# Patient Record
Sex: MALE | Race: BLACK OR AFRICAN AMERICAN | NOT HISPANIC OR LATINO | Employment: FULL TIME | URBAN - METROPOLITAN AREA
[De-identification: names, ages, dates, MRNs, and addresses within clinical notes are randomized per-mention and may not be internally consistent; named-entity substitution may affect disease eponyms.]

---

## 2017-05-19 ENCOUNTER — HOSPITAL ENCOUNTER (EMERGENCY)
Facility: OTHER | Age: 57
Discharge: HOME | End: 2017-05-19
Attending: EMERGENCY MEDICINE
Payer: COMMERCIAL

## 2017-05-19 VITALS
SYSTOLIC BLOOD PRESSURE: 149 MMHG | RESPIRATION RATE: 20 BRPM | TEMPERATURE: 98 F | HEART RATE: 84 BPM | HEIGHT: 74 IN | BODY MASS INDEX: 40.43 KG/M2 | OXYGEN SATURATION: 96 % | DIASTOLIC BLOOD PRESSURE: 83 MMHG | WEIGHT: 315 LBS

## 2017-05-19 DIAGNOSIS — J40 BRONCHITIS: ICD-10-CM

## 2017-05-19 DIAGNOSIS — R11.10 POST-TUSSIVE EMESIS: ICD-10-CM

## 2017-05-19 DIAGNOSIS — J32.9 SINUSITIS, UNSPECIFIED CHRONICITY, UNSPECIFIED LOCATION: Primary | ICD-10-CM

## 2017-05-19 LAB
ALBUMIN SERPL-MCNC: 3.2 G/DL (ref 3.3–5.5)
ALBUMIN SERPL-MCNC: 3.4 G/DL (ref 3.3–5.5)
ALP SERPL-CCNC: 88 U/L (ref 42–141)
ALP SERPL-CCNC: 89 U/L (ref 42–141)
BILIRUB SERPL-MCNC: 0.5 MG/DL (ref 0.2–1.6)
BILIRUB SERPL-MCNC: 0.5 MG/DL (ref 0.2–1.6)
BUN SERPL-MCNC: 15 MG/DL (ref 7–22)
CALCIUM SERPL-MCNC: 8.4 MG/DL (ref 8–10.3)
CHLORIDE SERPL-SCNC: 100 MMOL/L (ref 98–108)
CREAT SERPL-MCNC: 1.2 MG/DL (ref 0.6–1.2)
GLUCOSE SERPL-MCNC: 112 MG/DL (ref 73–118)
POC ALT (SGPT): 30 U/L (ref 10–47)
POC ALT (SGPT): 33 U/L (ref 10–47)
POC AMYLASE: 61 U/L (ref 14–97)
POC AST (SGOT): 34 U/L (ref 11–38)
POC AST (SGOT): 35 U/L (ref 11–38)
POC B-TYPE NATRIURETIC PEPTIDE: <5 PG/ML (ref 0–100)
POC CARDIAC TROPONIN I: 0 NG/ML
POC GGT: 22 U/L (ref 5–65)
POC TCO2: 26 MMOL/L (ref 18–33)
POTASSIUM BLD-SCNC: 3.1 MMOL/L (ref 3.6–5.1)
PROTEIN, POC: 7 G/DL (ref 6.4–8.1)
PROTEIN, POC: 7.1 G/DL (ref 6.4–8.1)
SAMPLE: NORMAL
SODIUM BLD-SCNC: 140 MMOL/L (ref 128–145)

## 2017-05-19 PROCEDURE — 63600175 PHARM REV CODE 636 W HCPCS

## 2017-05-19 PROCEDURE — 99284 EMERGENCY DEPT VISIT MOD MDM: CPT | Mod: 25

## 2017-05-19 PROCEDURE — 80053 COMPREHEN METABOLIC PANEL: CPT

## 2017-05-19 PROCEDURE — 96372 THER/PROPH/DIAG INJ SC/IM: CPT

## 2017-05-19 PROCEDURE — 85025 COMPLETE CBC W/AUTO DIFF WBC: CPT

## 2017-05-19 PROCEDURE — 82040 ASSAY OF SERUM ALBUMIN: CPT

## 2017-05-19 PROCEDURE — 94640 AIRWAY INHALATION TREATMENT: CPT

## 2017-05-19 PROCEDURE — 63600175 PHARM REV CODE 636 W HCPCS: Performed by: EMERGENCY MEDICINE

## 2017-05-19 PROCEDURE — 84484 ASSAY OF TROPONIN QUANT: CPT

## 2017-05-19 PROCEDURE — 25000242 PHARM REV CODE 250 ALT 637 W/ HCPCS: Performed by: EMERGENCY MEDICINE

## 2017-05-19 PROCEDURE — 83880 ASSAY OF NATRIURETIC PEPTIDE: CPT

## 2017-05-19 RX ORDER — DOXAZOSIN 4 MG/1
4 TABLET ORAL EVERY 12 HOURS
COMMUNITY

## 2017-05-19 RX ORDER — CHLORTHALIDONE 25 MG/1
25 TABLET ORAL DAILY
COMMUNITY

## 2017-05-19 RX ORDER — CHLORPHENIRAMINE MALEATE 4 MG
4 TABLET ORAL EVERY 6 HOURS PRN
Qty: 30 TABLET | Refills: 0 | Status: SHIPPED | OUTPATIENT
Start: 2017-05-19

## 2017-05-19 RX ORDER — IPRATROPIUM BROMIDE AND ALBUTEROL SULFATE 2.5; .5 MG/3ML; MG/3ML
3 SOLUTION RESPIRATORY (INHALATION)
Status: COMPLETED | OUTPATIENT
Start: 2017-05-19 | End: 2017-05-19

## 2017-05-19 RX ORDER — AMLODIPINE BESYLATE 10 MG/1
10 TABLET ORAL DAILY
COMMUNITY

## 2017-05-19 RX ORDER — ALBUTEROL SULFATE 90 UG/1
2 AEROSOL, METERED RESPIRATORY (INHALATION) EVERY 6 HOURS PRN
Qty: 1 INHALER | Refills: 0 | Status: SHIPPED | OUTPATIENT
Start: 2017-05-19

## 2017-05-19 RX ORDER — HYDROCODONE POLISTIREX AND CHLORPHENIRAMINE POLISTIREX 10; 8 MG/5ML; MG/5ML
5 SUSPENSION, EXTENDED RELEASE ORAL NIGHTLY PRN
Qty: 60 ML | Refills: 0 | Status: SHIPPED | OUTPATIENT
Start: 2017-05-19

## 2017-05-19 RX ORDER — ONDANSETRON 2 MG/ML
INJECTION INTRAMUSCULAR; INTRAVENOUS
Status: COMPLETED
Start: 2017-05-19 | End: 2017-05-19

## 2017-05-19 RX ORDER — IRBESARTAN 300 MG/1
300 TABLET ORAL NIGHTLY
COMMUNITY

## 2017-05-19 RX ORDER — DEXAMETHASONE SODIUM PHOSPHATE 4 MG/ML
8 INJECTION, SOLUTION INTRA-ARTICULAR; INTRALESIONAL; INTRAMUSCULAR; INTRAVENOUS; SOFT TISSUE
Status: COMPLETED | OUTPATIENT
Start: 2017-05-19 | End: 2017-05-19

## 2017-05-19 RX ORDER — CARVEDILOL 25 MG/1
25 TABLET ORAL 2 TIMES DAILY WITH MEALS
COMMUNITY

## 2017-05-19 RX ORDER — BENZONATATE 100 MG/1
100 CAPSULE ORAL 3 TIMES DAILY PRN
Qty: 20 CAPSULE | Refills: 0 | Status: SHIPPED | OUTPATIENT
Start: 2017-05-19 | End: 2017-05-29

## 2017-05-19 RX ADMIN — IPRATROPIUM BROMIDE AND ALBUTEROL SULFATE 3 ML: .5; 3 SOLUTION RESPIRATORY (INHALATION) at 05:05

## 2017-05-19 RX ADMIN — ONDANSETRON 8 MG: 2 INJECTION INTRAMUSCULAR; INTRAVENOUS at 02:05

## 2017-05-19 RX ADMIN — DEXAMETHASONE SODIUM PHOSPHATE 8 MG: 4 INJECTION, SOLUTION INTRAMUSCULAR; INTRAVENOUS at 04:05

## 2017-05-19 NOTE — ED PROVIDER NOTES
Encounter Date: 5/19/2017       History     Chief Complaint   Patient presents with    Sinusitis     states he began with sinus problems on Sunday, tonight he began vomiting and burping, states his burps are very foul smelling    Vomiting     Review of patient's allergies indicates:   Allergen Reactions    Benicar [olmesartan] Swelling    Codeine Nausea And Vomiting     Patient is a 57 y.o. male presenting with the following complaint: URI.   URI   The primary symptoms include sore throat, cough and vomiting (post tussive emesis). Primary symptoms do not include fever, headaches, wheezing, abdominal pain, nausea, myalgias or rash. The current episode started several days ago (3-4 days). This is a new problem. The problem has been gradually worsening.   The sore throat is not accompanied by trouble swallowing or drooling.   Symptoms associated with the illness include sinus pressure, congestion and rhinorrhea.   currently taking amoxicillin 2000 mg for dental pain (started two days ago).  Taking Allegra since yesterday.    Past Medical History:   Diagnosis Date    Anticoagulant long-term use     Anxiety     DVT (deep venous thrombosis)     GERD (gastroesophageal reflux disease)     Hypertension      Past Surgical History:   Procedure Laterality Date    KNEE SURGERY      ROTATOR CUFF REPAIR       Family History   Problem Relation Age of Onset    Hyperlipidemia Mother     Stroke Mother     Heart disease Mother     Arthritis Mother     Hyperlipidemia Father     Stroke Father      Social History   Substance Use Topics    Smoking status: Never Smoker    Smokeless tobacco: None    Alcohol use No     Review of Systems   Constitutional: Negative for appetite change and fever.   HENT: Positive for congestion, dental problem, postnasal drip, rhinorrhea, sinus pressure, sore throat and voice change. Negative for drooling, facial swelling, sneezing and trouble swallowing.    Respiratory: Positive for cough.  Negative for shortness of breath and wheezing.    Cardiovascular: Negative for chest pain, palpitations and leg swelling.   Gastrointestinal: Positive for vomiting (post tussive emesis). Negative for abdominal pain, constipation, diarrhea and nausea.   Genitourinary: Negative for decreased urine volume and frequency.   Musculoskeletal: Negative for myalgias.   Skin: Negative for rash.   Neurological: Negative for headaches.   All other systems reviewed and are negative.      Physical Exam   Initial Vitals   BP Pulse Resp Temp SpO2   05/19/17 0212 05/19/17 0212 05/19/17 0212 05/19/17 0212 --   149/83 94 18 98.2 °F (36.8 °C)      Physical Exam    Nursing note and vitals reviewed.  Constitutional: He appears well-developed and well-nourished. He is not diaphoretic. He is Obese . No distress.   HENT:   Head: Normocephalic and atraumatic.   Right Ear: Tympanic membrane is not injected, not erythematous and not bulging. A middle ear effusion (pink-tinged mucus) is present.   Left Ear: Tympanic membrane is not injected, not erythematous and not bulging. A middle ear effusion (pink-tinged mucus) is present.   Nose: Mucosal edema present.   Mouth/Throat: Oropharynx is clear and moist. No oropharyngeal exudate.   Eyes: EOM are normal. Pupils are equal, round, and reactive to light. No scleral icterus.   Neck: Normal range of motion. Neck supple.   Cardiovascular: Normal rate, regular rhythm and intact distal pulses.   No murmur heard.  Pulmonary/Chest: No stridor. No respiratory distress. He has decreased breath sounds in the right upper field, the right middle field, the right lower field, the left upper field, the left middle field and the left lower field. He has no wheezes. He has no rhonchi. He has no rales. He exhibits no tenderness.   Abdominal: Soft. Bowel sounds are normal. There is no tenderness.   Musculoskeletal: Normal range of motion. He exhibits no edema.   Neurological: He is alert and oriented to person,  place, and time. He has normal strength.   Skin: Skin is warm. No pallor.   Psychiatric: He has a normal mood and affect.         ED Course   Procedures  Labs Reviewed   POCT B-TYPE NATRIURETIC PEPTIDE (BNP) - Abnormal; Notable for the following:        Result Value    POC B-Type Natriuretic Peptide <5.0 (*)     All other components within normal limits   POCT CMP - Abnormal; Notable for the following:     Albumin, POC 3.2 (*)     POC Potassium 3.1 (*)     All other components within normal limits   TROPONIN ISTAT   POCT CBC   POCT CMP   POCT LIVER PANEL   POCT B-TYPE NATRIURETIC PEPTIDE (BNP)   POCT TROPONIN   POCT LIVER PANEL     EKG Readings: (Independently Interpreted)   Initial Reading: No STEMI. Previous EKG: Compared with most recent EKG Previous EKG Date: 1/12/2014. Rhythm: Normal Sinus Rhythm. Heart Rate: 89. Ectopy: No Ectopy. Conduction: 1st Degree AV Block. ST Segments: Normal ST Segments. T Waves: Normal. Axis: Normal.          Medical Decision Making:   Clinical Tests:   Lab Tests: Ordered and Reviewed                   ED Course     Labs Reviewed  Admission on 05/19/2017   Component Date Value Ref Range Status    POC B-Type Natriuretic Peptide 05/19/2017 <5.0* 0.0 - 100.0 pg/mL Final    POC Cardiac Troponin I 05/19/2017 0.00  <0.09 ng/mL Final    Sample 05/19/2017 UNK   Final    Albumin, POC 05/19/2017 3.4  3.3 - 5.5 g/dL Final    Alkaline Phosphatase, POC 05/19/2017 88  42 - 141 U/L Final    ALT (SGPT), POC 05/19/2017 30  10 - 47 U/L Final    Amylase, POC 05/19/2017 61  14 - 97 U/L Final    AST (SGOT), POC 05/19/2017 35  11 - 38 U/L Final    POC GGT 05/19/2017 22  5 - 65 U/L Final    Bilirubin 05/19/2017 0.5  0.2 - 1.6 mg/dL Final    Protein 05/19/2017 7.1  6.4 - 8.1 g/dL Final    Albumin, POC 05/19/2017 3.2* 3.3 - 5.5 g/dL Final    Alkaline Phosphatase, POC 05/19/2017 89  42 - 141 U/L Final    ALT (SGPT), POC 05/19/2017 33  10 - 47 U/L Final    AST (SGOT), POC 05/19/2017 34  11 - 38  U/L Final    POC BUN 05/19/2017 15  7 - 22 mg/dL Final    Calcium, POC 05/19/2017 8.4  8.0 - 10.3 mg/dL Final    POC Chloride 05/19/2017 100  98 - 108 mmol/L Final    POC Creatinine 05/19/2017 1.2  0.6 - 1.2 mg/dL Final    POC Glucose 05/19/2017 112  73 - 118 mg/dL Final    POC Potassium 05/19/2017 3.1* 3.6 - 5.1 mmol/L Final    POC Sodium 05/19/2017 140  128 - 145 mmol/L Final    Bilirubin 05/19/2017 0.5  0.2 - 1.6 mg/dL Final    POC TCO2 05/19/2017 26  18 - 33 mmol/L Final    Protein 05/19/2017 7.0  6.4 - 8.1 g/dL Final        Imaging Reviewed    Imaging Results     None          Medications given in ED    Medications   ondansetron 4 mg/2 mL injection (8 mg  Given 5/19/17 025)   dexamethasone injection 8 mg (8 mg Intramuscular Given 5/19/17 8466)   albuterol-ipratropium 2.5mg-0.5mg/3mL nebulizer solution 3 mL (3 mLs Nebulization Given 5/19/17 0579)       Discharge Medications     Medication List with Changes/Refills   New Medications    ALBUTEROL 90 MCG/ACTUATION INHALER    Inhale 2 puffs into the lungs every 6 (six) hours as needed for Wheezing or Shortness of Breath.    BENZONATATE (TESSALON) 100 MG CAPSULE    Take 1 capsule (100 mg total) by mouth 3 (three) times daily as needed for Cough.    CHLORPHENIRAMINE (CHLOR-TRIMETON) 4 MG TABLET    Take 1 tablet (4 mg total) by mouth every 6 (six) hours as needed (runny nose and sinus congestion).    HYDROCODONE-CHLORPHENIRAMINE (TUSSIONEX) 10-8 MG/5 ML SUSPENSION    Take 5 mLs by mouth nightly as needed for Cough or Congestion.   Current Medications    ALPRAZOLAM (XANAX) 0.5 MG TABLET    Take 0.5 mg by mouth 3 (three) times daily.    AMLODIPINE (NORVASC) 10 MG TABLET    Take 10 mg by mouth once daily.    ASPIRIN (ECOTRIN) 81 MG EC TABLET    Take 81 mg by mouth once daily.    CARVEDILOL (COREG) 25 MG TABLET    Take 25 mg by mouth 2 (two) times daily with meals.    CHLORTHALIDONE (HYGROTEN) 25 MG TAB    Take 25 mg by mouth once daily.    DILTIAZEM (DILACOR  XR) 240 MG CDCR    Take 180 mg by mouth once daily.    DOXAZOSIN (CARDURA) 4 MG TABLET    Take 4 mg by mouth every 12 (twelve) hours.    IRBESARTAN (AVAPRO) 300 MG TABLET    Take 300 mg by mouth every evening.    NORTRIPTYLINE (PAMELOR) 50 MG CAPSULE    Take 50 mg by mouth every evening.    OLMESARTAN (BENICAR) 40 MG TABLET    Take 40 mg by mouth once daily.    PANTOPRAZOLE (PROTONIX) 40 MG TABLET    Take 40 mg by mouth once daily.    POTASSIUM CHLORIDE (KLOR-CON) 20 MEQ PACK    Take 20 mEq by mouth 4 (four) times daily.    SILDENAFIL (VIAGRA) 100 MG TABLET    Take 100 mg by mouth daily as needed.    TRIAMTERENE-HYDROCHLOROTHIAZIDE 37.5-25 MG (MAXZIDE-25) 37.5-25 MG PER TABLET    Take 1 tablet by mouth once daily.   Discontinued Medications    LEVALBUTEROL (XOPENEX HFA) 45 MCG/ACTUATION INHALER    Inhale 1-2 puffs into the lungs every 4 (four) hours as needed for Wheezing.    OXYCODONE-ACETAMINOPHEN 5-325 MG (PERCOCET) 5-325 MG PER TABLET    Take 1 tablet by mouth every 6 (six) hours as needed for Pain.             Patient discharged to home in stable condition with instructions to:   1. Please take all meds as prescribed.  2. Follow-up with your primary care doctor   3. Return precautions discussed and patient and/or family/caretaker understands to return to the emergency room for any concerns including worsening of your current symptoms, fever, chills, night sweats, worsening pain, chest pain, shortness of breath, nausea, vomiting, diarrhea, bleeding, headache, difficulty talking, visual disturbances, weakness, numbness or any other acute concerns    Clinical Impression:   The primary encounter diagnosis was Sinusitis, unspecified chronicity, unspecified location. Diagnoses of Bronchitis and Post-tussive emesis were also pertinent to this visit.          Jose Eduardo Sawyer MD  05/19/17 6072

## 2017-05-19 NOTE — ED AVS SNAPSHOT
[facility] EMERGENCY DEPARTMENT  [address] [place] [address]               Mundo MCKENZIE Adama Archibald   2017  2:48 AM   ED    Description:  Male : 1960   Department:  [facility] Emergency Department           Your Care was Coordinated By:     Provider Role From To    Jose Eduardo Sawyer MD Attending Provider 17 0241 --      Reason for Visit     Sinusitis     Vomiting           Diagnoses this Visit        Comments    Sinusitis, unspecified chronicity, unspecified location    -  Primary     Bronchitis         Post-tussive emesis           ED Disposition     ED Disposition Condition Comment    Discharge  Patient discharged to home in stable condition.              To Do List           Follow-up Information     Follow up with Contreras Ny MD. Call today.    Specialty:  Family Medicine    Why:  to schedule an appointment, for re-evaluation of today's complaint, and ongoing care    Contact information:    [address] [place] [id number] [phone]         These Medications        Disp Refills Start End    hydrocodone-chlorpheniramine (TUSSIONEX) 10-8 mg/5 mL suspension 60 mL 0 2017     Take 5 mLs by mouth nightly as needed for Cough or Congestion. - Oral    Pharmacy: [facility]/pharm[facility][id number] [facility] [place] [facility] [address] [facility] [facility] Ph #: [phone]       chlorpheniramine (CHLOR-TRIMETON) 4 mg tablet 30 tablet 0 2017     Take 1 tablet (4 mg total) by mouth every 6 (six) hours as needed (runny nose and sinus congestion). - Oral    Pharmacy: [facility]/pharmacy #[id number] [facility] [place] [facility] [address] Ph #: [phone]       albuterol 90 mcg/actuation inhaler 1 Inhaler 0 2017     Inhale 2 puffs into the lungs every 6 (six) hours as needed for Wheezing or Shortness of Breath. - Inhalation    Pharmacy: [facility]/pharmacy #[id number] - [place] - [address] [facility]. Ph #: [phone]       benzonatate (TESSALON) 100 MG capsule 20 capsule 0 2017    Take 1 capsule (100 mg  total) by mouth 3 (three) times daily as needed for Cough. - Oral    Pharmacy: [id number] - [place] - [address] [facility].  #: [phone]         [facility] On Call     [facility] On Call Nurse Care Line - 24/7 Assistance  Unless otherwise directed by your provider, please contact [facility] On-Call, our nurse care line that is available for 24/7 assistance.     Registered nurses in the [facility] provide: appointment scheduling, clinical advisement, health education, and other advisory services.  Call: [phone] (toll free)               Medications           Message regarding Medications     Verify the changes and/or additions to your medication regime listed below are the same as discussed with your clinician today.  If any of these changes or additions are incorrect, please notify your healthcare provider.        START taking these NEW medications        Refills    hydrocodone-chlorpheniramine (TUSSIONEX) 10-8 mg/5 mL suspension 0    Sig: Take 5 mLs by mouth nightly as needed for Cough or Congestion.    Class: Print    Route: Oral    chlorpheniramine (CHLOR-TRIMETON) 4 mg tablet 0    Sig: Take 1 tablet (4 mg total) by mouth every 6 (six) hours as needed (runny nose and sinus congestion).    Class: Normal    Route: Oral    albuterol 90 mcg/actuation inhaler 0    Sig: Inhale 2 puffs into the lungs every 6 (six) hours as needed for Wheezing or Shortness of Breath.    Class: Normal    Route: Inhalation    benzonatate (TESSALON) 100 MG capsule 0    Sig: Take 1 capsule (100 mg total) by mouth 3 (three) times daily as needed for Cough.    Class: Normal    Route: Oral      These medications were administered today        Dose Freq    ondansetron 4 mg/2 mL injection      Notes to Pharmacy: Created by cabinet override    dexamethasone injection 8 mg 8 mg ED 1 Time    Sig: Inject 2 mLs (8 mg total) into the muscle ED 1 Time.    Class: Normal    Route: Intramuscular    albuterol-ipratropium  2.5mg-0.5mg/3mL nebulizer solution 3 mL 3 mL ED 1 Time    Sig: Take 3 mLs by nebulization ED 1 Time.    Class: Normal    Route: Nebulization      STOP taking these medications     oxycodone-acetaminophen 5-325 mg (PERCOCET) 5-325 mg per tablet Take 1 tablet by mouth every 6 (six) hours as needed for Pain.    levalbuterol (XOPENEX HFA) 45 mcg/actuation inhaler Inhale 1-2 puffs into the lungs every 4 (four) hours as needed for Wheezing.           Verify that the below list of medications is an accurate representation of the medications you are currently taking.  If none reported, the list may be blank. If incorrect, please contact your healthcare provider. Carry this list with you in case of emergency.           Current Medications     amlodipine (NORVASC) 10 MG tablet Take 10 mg by mouth once daily.    aspirin (ECOTRIN) 81 MG EC tablet Take 81 mg by mouth once daily.    carvedilol (COREG) 25 MG tablet Take 25 mg by mouth 2 (two) times daily with meals.    chlorthalidone (HYGROTEN) 25 MG Tab Take 25 mg by mouth once daily.    doxazosin (CARDURA) 4 MG tablet Take 4 mg by mouth every 12 (twelve) hours.    irbesartan (AVAPRO) 300 MG tablet Take 300 mg by mouth every evening.    nortriptyline (PAMELOR) 50 MG capsule Take 50 mg by mouth every evening.    pantoprazole (PROTONIX) 40 MG tablet Take 40 mg by mouth once daily.    potassium chloride (KLOR-CON) 20 mEq Pack Take 20 mEq by mouth 4 (four) times daily.    albuterol 90 mcg/actuation inhaler Inhale 2 puffs into the lungs every 6 (six) hours as needed for Wheezing or Shortness of Breath.    alprazolam (XANAX) 0.5 MG tablet Take 0.5 mg by mouth 3 (three) times daily.    benzonatate (TESSALON) 100 MG capsule Take 1 capsule (100 mg total) by mouth 3 (three) times daily as needed for Cough.    chlorpheniramine (CHLOR-TRIMETON) 4 mg tablet Take 1 tablet (4 mg total) by mouth every 6 (six) hours as needed (runny nose and sinus congestion).    diltiazem (DILACOR XR) 240 MG  "CDCR Take 180 mg by mouth once daily.    hydrocodone-chlorpheniramine (TUSSIONEX) 10-8 mg/5 mL suspension Take 5 mLs by mouth nightly as needed for Cough or Congestion.    olmesartan (BENICAR) 40 MG tablet Take 40 mg by mouth once daily.    sildenafil (VIAGRA) 100 MG tablet Take 100 mg by mouth daily as needed.    triamterene-hydrochlorothiazide 37.5-25 mg (MAXZIDE-25) 37.5-25 mg per tablet Take 1 tablet by mouth once daily.           Clinical Reference Information           Your Vitals Were     BP Pulse Temp Resp Height Weight    149/83 (BP Location: Right arm, Patient Position: Sitting) 84 98.2 °F (36.8 °C) (Temporal) 20 6' 2" (1.88 m) 177.5 kg (391 lb 4 oz)    SpO2 BMI             96% 50.23 kg/m2         Allergies as of 5/19/2017        Reactions    Benicar [Olmesartan] Swelling    Codeine Nausea And Vomiting      Immunizations Administered on Date of Encounter - 5/19/2017     None      ED Micro, Lab, POCT     Start Ordered       Status Ordering Provider    05/19/17 0329 05/19/17 0329  POCT CMP  Once      Final result     05/19/17 0314 05/19/17 0314  POCT B-type natriuretic peptide (BNP)  Once      Final result     05/19/17 0307 05/19/17 0307  POCT Liver Panel  Once      Final result     05/19/17 0301 05/19/17 0301  Troponin ISTAT  Once      Final result     05/19/17 0251 05/19/17 0250  POCT B-type natriuretic peptide (BNP)  Once      Completed     05/19/17 0251 05/19/17 0250  POCT Troponin  Once      Completed     05/19/17 0250 05/19/17 0250  POCT CBC  Once      Final result     05/19/17 0250 05/19/17 0250  POCT CMP  Once      Completed     05/19/17 0250 05/19/17 0250  POCT Liver Panel  Once      Completed       ED Imaging Orders     None        Discharge Instructions         Bronchitis, Antibiotic Treatment (Adult)    Bronchitis is an infection of the air passages (bronchial tubes) in your lungs. It often occurs when you have a cold. This illness is contagious during the first few days and is spread through the " air by coughing and sneezing, or by direct contact (touching the sick person and then touching your own eyes, nose, or mouth).  Symptoms of bronchitis include cough with mucus (phlegm) and low-grade fever. Bronchitis usually lasts 7 to 14 days. Mild cases can be treated with simple home remedies. More severe infection is treated with an antibiotic.  Home care  Follow these guidelines when caring for yourself at home:  · If your symptoms are severe, rest at home for the first 2 to 3 days. When you go back to your usual activities, don't let yourself get too tired.  · Do not smoke. Also avoid being exposed to secondhand smoke.  · You may use over-the-counter medicines to control fever or pain, unless another medicine was prescribed. (Note: If you have chronic liver or kidney disease or have ever had a stomach ulcer or gastrointestinal bleeding, talk with your healthcare provider before using these medicines. Also talk to your provider if you are taking medicine to prevent blood clots.) Aspirin should never be given to anyone younger than 18 years of age who is ill with a viral infection or fever. It may cause severe liver or brain damage.  · Your appetite may be poor, so a light diet is fine. Avoid dehydration by drinking 6 to 8 glasses of fluids per day (such as water, soft drinks, sports drinks, juices, tea, or soup). Extra fluids will help loosen secretions in the nose and lungs.  · Over-the-counter cough, cold, and sore-throat medicines will not shorten the length of the illness, but they may be helpful to reduce symptoms. (Note: Do not use decongestants if you have high blood pressure.)  · Finish all antibiotic medicine. Do this even if you are feeling better after only a few days.  Follow-up care  Follow up with your healthcare provider, or as advised. If you had an X-ray or ECG (electrocardiogram), a specialist will review it. You will be notified of any new findings that may affect your care.  Note: If you are  age 65 or older, or if you have a chronic lung disease or condition that affects your immune system, or you smoke, talk to your healthcare provider about having pneumococcal vaccinations and a yearly influenza vaccination (flu shot).  When to seek medical advice  Call your healthcare provider right away if any of these occur:  · Fever of 100.4°F (38°C) or higher  · Coughing up increased amounts of colored sputum  · Weakness, drowsiness, headache, facial pain, ear pain, or a stiff neck  Call 911, or get immediate medical care  Contact emergency services right away if any of these occur.  · Coughing up blood  · Worsening weakness, drowsiness, headache, or stiff neck  · Trouble breathing, wheezing, or pain with breathing  Date Last Reviewed: 9/13/2015 © 2000-2016 [employer] [address] [place] [address]. All rights reserved. This information is not intended as a substitute for professional medical care. Always follow your healthcare professional's instructions.          [facility] Sign-Up     Activating your [facility] account is as easy as 1-2-3!     1) Visit [email or web address], select Sign Up Now, enter this activation code and your date of birth, then select Next.  [email or web address]  Expires: 7/3/2017  5:25 AM      2) Create a username and password to use when you visit [facility] in the future and select a security question in case you lose your password and select Next.    3) Enter your e-mail address and click Sign Up!    Additional Information  If you have questions, please e-mail [email or web address] or call [phone] to talk to our [facility] staff. Remember, [facility] is NOT to be used for urgent needs. For medical emergencies, dial 911.          [facility] Emergency Department complies with applicable Federal civil rights laws and does not discriminate on the basis of race, color, national origin, age, disability, or sex.        Language Assistance Services     ATTENTION: Language  assistance services are available, free of charge. Please call [phone].      ATENCIÓN: Si habla español, tiene a garcia disposición servicios gratuitos de asistencia lingüística. Llame al [phone].     CHÚ Ý: N?u b?n nói Ti?ng Vi?t, có các d?ch v? h? tr? ngôn ng? mi?n phí dành cho b?n. G?i s? [phone].

## 2017-05-19 NOTE — DISCHARGE INSTRUCTIONS
Bronchitis, Antibiotic Treatment (Adult)    Bronchitis is an infection of the air passages (bronchial tubes) in your lungs. It often occurs when you have a cold. This illness is contagious during the first few days and is spread through the air by coughing and sneezing, or by direct contact (touching the sick person and then touching your own eyes, nose, or mouth).  Symptoms of bronchitis include cough with mucus (phlegm) and low-grade fever. Bronchitis usually lasts 7 to 14 days. Mild cases can be treated with simple home remedies. More severe infection is treated with an antibiotic.  Home care  Follow these guidelines when caring for yourself at home:  · If your symptoms are severe, rest at home for the first 2 to 3 days. When you go back to your usual activities, don't let yourself get too tired.  · Do not smoke. Also avoid being exposed to secondhand smoke.  · You may use over-the-counter medicines to control fever or pain, unless another medicine was prescribed. (Note: If you have chronic liver or kidney disease or have ever had a stomach ulcer or gastrointestinal bleeding, talk with your healthcare provider before using these medicines. Also talk to your provider if you are taking medicine to prevent blood clots.) Aspirin should never be given to anyone younger than 18 years of age who is ill with a viral infection or fever. It may cause severe liver or brain damage.  · Your appetite may be poor, so a light diet is fine. Avoid dehydration by drinking 6 to 8 glasses of fluids per day (such as water, soft drinks, sports drinks, juices, tea, or soup). Extra fluids will help loosen secretions in the nose and lungs.  · Over-the-counter cough, cold, and sore-throat medicines will not shorten the length of the illness, but they may be helpful to reduce symptoms. (Note: Do not use decongestants if you have high blood pressure.)  · Finish all antibiotic medicine. Do this even if you are feeling better after only a  few days.  Follow-up care  Follow up with your healthcare provider, or as advised. If you had an X-ray or ECG (electrocardiogram), a specialist will review it. You will be notified of any new findings that may affect your care.  Note: If you are age 65 or older, or if you have a chronic lung disease or condition that affects your immune system, or you smoke, talk to your healthcare provider about having pneumococcal vaccinations and a yearly influenza vaccination (flu shot).  When to seek medical advice  Call your healthcare provider right away if any of these occur:  · Fever of 100.4°F (38°C) or higher  · Coughing up increased amounts of colored sputum  · Weakness, drowsiness, headache, facial pain, ear pain, or a stiff neck  Call 911, or get immediate medical care  Contact emergency services right away if any of these occur.  · Coughing up blood  · Worsening weakness, drowsiness, headache, or stiff neck  · Trouble breathing, wheezing, or pain with breathing  Date Last Reviewed: 9/13/2015 © 2000-2016 [email or web address]. [address] [place] [address], [place] [address]. All rights reserved. This information is not intended as a substitute for professional medical care. Always follow your healthcare professional's instructions.

## 2017-05-21 ENCOUNTER — HOSPITAL ENCOUNTER (EMERGENCY)
Facility: OTHER | Age: 57
Discharge: HOME | End: 2017-05-21
Attending: EMERGENCY MEDICINE
Payer: COMMERCIAL

## 2017-05-21 VITALS
SYSTOLIC BLOOD PRESSURE: 156 MMHG | DIASTOLIC BLOOD PRESSURE: 93 MMHG | BODY MASS INDEX: 49.43 KG/M2 | RESPIRATION RATE: 18 BRPM | HEART RATE: 92 BPM | WEIGHT: 315 LBS | TEMPERATURE: 98 F | OXYGEN SATURATION: 98 %

## 2017-05-21 DIAGNOSIS — R05.9 COUGH: ICD-10-CM

## 2017-05-21 DIAGNOSIS — J20.9 ACUTE BRONCHITIS, UNSPECIFIED ORGANISM: Primary | ICD-10-CM

## 2017-05-21 PROCEDURE — 25000242 PHARM REV CODE 250 ALT 637 W/ HCPCS: Performed by: EMERGENCY MEDICINE

## 2017-05-21 PROCEDURE — 99284 EMERGENCY DEPT VISIT MOD MDM: CPT | Mod: 25

## 2017-05-21 PROCEDURE — 94640 AIRWAY INHALATION TREATMENT: CPT

## 2017-05-21 RX ORDER — CLINDAMYCIN HYDROCHLORIDE 300 MG/1
300 CAPSULE ORAL EVERY 6 HOURS
COMMUNITY
End: 2017-09-23

## 2017-05-21 RX ORDER — METHYLPREDNISOLONE 4 MG/1
TABLET ORAL
Qty: 1 PACKAGE | Refills: 0 | Status: SHIPPED | OUTPATIENT
Start: 2017-05-21 | End: 2017-06-11

## 2017-05-21 RX ORDER — IPRATROPIUM BROMIDE AND ALBUTEROL SULFATE 2.5; .5 MG/3ML; MG/3ML
3 SOLUTION RESPIRATORY (INHALATION) ONCE
Status: COMPLETED | OUTPATIENT
Start: 2017-05-21 | End: 2017-05-21

## 2017-05-21 RX ADMIN — IPRATROPIUM BROMIDE AND ALBUTEROL SULFATE 3 ML: .5; 3 SOLUTION RESPIRATORY (INHALATION) at 11:05

## 2017-05-21 NOTE — ED PROVIDER NOTES
Encounter Date: 5/21/2017       History     Chief Complaint   Patient presents with    Cough     congestion and cough x 4 days, states he was seen here Friday     Review of patient's allergies indicates:   Allergen Reactions    Benicar [olmesartan] Swelling    Codeine Nausea And Vomiting     The history is provided by the patient.   Cough   This is a recurrent problem. The current episode started several days ago. The problem occurs constantly. The problem has been unchanged. The cough is non-productive. There has been no fever. The fever has been present for 5 days or more. Associated symptoms include wheezing. Pertinent negatives include no shortness of breath. He is not a smoker. His past medical history is significant for bronchitis. His past medical history does not include pneumonia.     Past Medical History:   Diagnosis Date    Anticoagulant long-term use     Anxiety     DVT (deep venous thrombosis)     GERD (gastroesophageal reflux disease)     Hypertension      Past Surgical History:   Procedure Laterality Date    KNEE SURGERY      ROTATOR CUFF REPAIR       Family History   Problem Relation Age of Onset    Hyperlipidemia Mother     Stroke Mother     Heart disease Mother     Arthritis Mother     Hyperlipidemia Father     Stroke Father      Social History   Substance Use Topics    Smoking status: Never Smoker    Smokeless tobacco: Not on file    Alcohol use No     Review of Systems   Constitutional: Negative.    HENT: Negative.    Eyes: Negative.    Respiratory: Positive for cough and wheezing. Negative for shortness of breath.    Cardiovascular: Negative.    Gastrointestinal: Negative.    Endocrine: Negative.    Genitourinary: Negative.    Musculoskeletal: Negative.    Skin: Negative.    Allergic/Immunologic: Negative.    Neurological: Negative.    Hematological: Negative.    Psychiatric/Behavioral: Negative.    All other systems reviewed and are negative.      Physical Exam     Initial  Vitals [05/21/17 1049]   BP Pulse Resp Temp SpO2   (!) 156/93 95 18 97.9 °F (36.6 °C) 97 %     Physical Exam    Nursing note and vitals reviewed.  Constitutional: Vital signs are normal. He appears well-developed. He is active and cooperative.   HENT:   Head: Normocephalic and atraumatic.   Eyes: Conjunctivae, EOM and lids are normal. Pupils are equal, round, and reactive to light.   Neck: Trachea normal and full passive range of motion without pain. Neck supple. No thyroid mass present.   Cardiovascular: Normal rate, regular rhythm, S1 normal, S2 normal, normal heart sounds, intact distal pulses and normal pulses.   Abdominal: Soft. Normal appearance, normal aorta and bowel sounds are normal.   Musculoskeletal: Normal range of motion.   Lymphadenopathy:     He has no axillary adenopathy.   Neurological: He is alert and oriented to person, place, and time.   Skin: Skin is warm, dry and intact.   Psychiatric: He has a normal mood and affect. His speech is normal and behavior is normal. Judgment and thought content normal. Cognition and memory are normal.         ED Course   Procedures  Labs Reviewed - No data to display          Medical Decision Making:   ED Management:  The patient has improved with nebulizer therapy.  The patient states he received a shot of steroids and that help for a day or 2 he is wondering if he can be placed on a pill.  I agree with the diagnosis of bronchitis patient's also received a prescription of clindamycin from his family doctor O encouraged him to continue taking that medication and I'll put the patient on a Medrol Dosepak.           Imaging Results          X-Ray Chest PA And Lateral (Final result)  Result time 05/21/17 12:17:45    Final result by Omid Dawkins MD (05/21/17 12:17:45)                 Narrative:    Study Desc:   XR CHEST PA AND LATERAL  Clinical History: Cough.     Comparison exam: None.     FINDINGS:  The PA and lateral chest radiographs show normal lung volumes  without interstitial or   airspace opacities, pleural effusions or pneumothorax.     The heart size and pulmonary vasculature are normal. The trachea is midline. There are no   acute osseous abnormalities noted.     IMPRESSION:  1. No acute cardiopulmonary disease.     SL: 24 Signed by: Omid Dawkins MD  2017-05-21 12:17:44 [CDT]                                       ED Course     Clinical Impression:   The primary encounter diagnosis was Acute bronchitis, unspecified organism. A diagnosis of Cough was also pertinent to this visit.          Mundo Cole MD  05/21/17 1221

## 2017-09-23 ENCOUNTER — HOSPITAL ENCOUNTER (EMERGENCY)
Facility: OTHER | Age: 57
Discharge: HOME | End: 2017-09-23
Attending: EMERGENCY MEDICINE
Payer: COMMERCIAL

## 2017-09-23 VITALS
HEIGHT: 74 IN | RESPIRATION RATE: 19 BRPM | WEIGHT: 315 LBS | DIASTOLIC BLOOD PRESSURE: 73 MMHG | SYSTOLIC BLOOD PRESSURE: 157 MMHG | BODY MASS INDEX: 40.43 KG/M2 | HEART RATE: 89 BPM | TEMPERATURE: 97 F | OXYGEN SATURATION: 97 %

## 2017-09-23 DIAGNOSIS — K59.00 CONSTIPATION, UNSPECIFIED CONSTIPATION TYPE: ICD-10-CM

## 2017-09-23 DIAGNOSIS — K64.4 EXTERNAL HEMORRHOIDS: ICD-10-CM

## 2017-09-23 DIAGNOSIS — R10.32 LLQ ABDOMINAL PAIN: Primary | ICD-10-CM

## 2017-09-23 LAB
ALBUMIN SERPL-MCNC: 3.4 G/DL (ref 3.3–5.5)
ALP SERPL-CCNC: 117 U/L (ref 42–141)
BILIRUB SERPL-MCNC: 0.6 MG/DL (ref 0.2–1.6)
BILIRUBIN, POC UA: NEGATIVE
BLOOD, POC UA: NEGATIVE
BUN SERPL-MCNC: 11 MG/DL (ref 7–22)
CALCIUM SERPL-MCNC: 8.2 MG/DL (ref 8–10.3)
CHLORIDE SERPL-SCNC: 99 MMOL/L (ref 98–108)
CLARITY, POC UA: CLEAR
COLOR, POC UA: YELLOW
CREAT SERPL-MCNC: 0.9 MG/DL (ref 0.6–1.2)
GLUCOSE SERPL-MCNC: 103 MG/DL (ref 73–118)
GLUCOSE, POC UA: NEGATIVE
KETONES, POC UA: NEGATIVE
LEUKOCYTE EST, POC UA: NEGATIVE
NITRITE, POC UA: NEGATIVE
PH UR STRIP: 7 [PH]
POC ALT (SGPT): 36 U/L (ref 10–47)
POC AST (SGOT): 30 U/L (ref 11–38)
POC TCO2: 26 MMOL/L (ref 18–33)
POTASSIUM BLD-SCNC: 3.4 MMOL/L (ref 3.6–5.1)
PROTEIN, POC UA: NEGATIVE
PROTEIN, POC: 7.1 G/DL (ref 6.4–8.1)
SODIUM BLD-SCNC: 144 MMOL/L (ref 128–145)
SPECIFIC GRAVITY, POC UA: 1.01
UROBILINOGEN, POC UA: 0.2 E.U./DL

## 2017-09-23 PROCEDURE — 80053 COMPREHEN METABOLIC PANEL: CPT

## 2017-09-23 PROCEDURE — 96360 HYDRATION IV INFUSION INIT: CPT

## 2017-09-23 PROCEDURE — 99284 EMERGENCY DEPT VISIT MOD MDM: CPT | Mod: 25

## 2017-09-23 PROCEDURE — 25000003 PHARM REV CODE 250: Performed by: EMERGENCY MEDICINE

## 2017-09-23 PROCEDURE — 85025 COMPLETE CBC W/AUTO DIFF WBC: CPT

## 2017-09-23 PROCEDURE — 81003 URINALYSIS AUTO W/O SCOPE: CPT

## 2017-09-23 RX ORDER — SODIUM CHLORIDE 9 MG/ML
1000 INJECTION, SOLUTION INTRAVENOUS
Status: COMPLETED | OUTPATIENT
Start: 2017-09-23 | End: 2017-09-23

## 2017-09-23 RX ORDER — METRONIDAZOLE 500 MG/1
500 TABLET ORAL EVERY 12 HOURS
Qty: 14 TABLET | Refills: 0 | Status: SHIPPED | OUTPATIENT
Start: 2017-09-23 | End: 2017-10-03

## 2017-09-23 RX ORDER — KETOROLAC TROMETHAMINE 30 MG/ML
30 INJECTION, SOLUTION INTRAMUSCULAR; INTRAVENOUS
Status: DISCONTINUED | OUTPATIENT
Start: 2017-09-23 | End: 2017-09-23 | Stop reason: HOSPADM

## 2017-09-23 RX ORDER — ONDANSETRON 2 MG/ML
4 INJECTION INTRAMUSCULAR; INTRAVENOUS
Status: DISCONTINUED | OUTPATIENT
Start: 2017-09-23 | End: 2017-09-23 | Stop reason: HOSPADM

## 2017-09-23 RX ORDER — CIPROFLOXACIN 500 MG/1
500 TABLET ORAL 2 TIMES DAILY
Qty: 20 TABLET | Refills: 0 | Status: SHIPPED | OUTPATIENT
Start: 2017-09-23 | End: 2017-10-03

## 2017-09-23 RX ORDER — SYRING-NEEDL,DISP,INSUL,0.3 ML 29 G X1/2"
296 SYRINGE, EMPTY DISPOSABLE MISCELLANEOUS ONCE AS NEEDED
Qty: 295 ML | Refills: 0 | COMMUNITY
Start: 2017-09-23 | End: 2017-09-23

## 2017-09-23 RX ADMIN — SODIUM CHLORIDE 1000 ML: 0.9 INJECTION, SOLUTION INTRAVENOUS at 04:09

## 2017-09-23 NOTE — ED TRIAGE NOTES
Pt presents to ER with c/o left groin pain that woke him from sleep yesterday at 3 am and pain increases with urination.  Denies any flank pain, fever, nausea or vomiting at this time.

## 2017-09-23 NOTE — ED PROVIDER NOTES
Encounter Date: 9/23/2017       History     Chief Complaint   Patient presents with    Groin Pain     Pt presents to ED with c/o left groin pain that started around 3am after getting up to urinate. Denies n/v at this time.     57 y.o. Male with asthma medical history of hypertension, GERD, anxiety presents to the emergency department: Complaining of acute left lower quadrant abdominal pain that began at 3 am yesterday and has been intermittent, 5/10 in severity since then.   He reports associated increased flatus and pain seems a bit worse when he urinates.  He also reports recent constipation and straining to have a bowel movement which caused his hemorrhoids to flare up.      The history is provided by the patient.     Review of patient's allergies indicates:   Allergen Reactions    Benicar [olmesartan] Swelling    Codeine Nausea And Vomiting     Past Medical History:   Diagnosis Date    Anticoagulant long-term use     Anxiety     DVT (deep venous thrombosis)     GERD (gastroesophageal reflux disease)     Hypertension      Past Surgical History:   Procedure Laterality Date    KNEE SURGERY      ROTATOR CUFF REPAIR       Family History   Problem Relation Age of Onset    Hyperlipidemia Mother     Stroke Mother     Heart disease Mother     Arthritis Mother     Hyperlipidemia Father     Stroke Father      Social History   Substance Use Topics    Smoking status: Never Smoker    Smokeless tobacco: Never Used    Alcohol use No     Review of Systems   Constitutional: Negative for chills and fever.   HENT: Negative.    Eyes: Negative.    Respiratory: Negative for cough, shortness of breath and stridor.    Cardiovascular: Negative for chest pain and palpitations.   Gastrointestinal: Positive for abdominal pain (LLQ), constipation and rectal pain. Negative for abdominal distention, anal bleeding, diarrhea, nausea and vomiting.   Genitourinary: Negative for dysuria and hematuria.   Musculoskeletal: Negative.     Skin: Negative.    Neurological: Negative for dizziness and headaches.   Psychiatric/Behavioral: Negative.    All other systems reviewed and are negative.      Physical Exam     Initial Vitals [09/23/17 0403]   BP Pulse Resp Temp SpO2   (!) 189/99 89 19 97.1 °F (36.2 °C) 97 %      MAP       129         Physical Exam    Nursing note and vitals reviewed.  Constitutional: He appears well-developed and well-nourished. He is not diaphoretic. He is Obese . He is cooperative. No distress.   HENT:   Head: Normocephalic and atraumatic.   Mouth/Throat: Oropharynx is clear and moist. No oropharyngeal exudate.   Eyes: EOM are normal. Pupils are equal, round, and reactive to light. No scleral icterus.   Neck: Normal range of motion. Neck supple.   Cardiovascular: Normal rate, regular rhythm and intact distal pulses.   No murmur heard.  Pulmonary/Chest: Breath sounds normal. No stridor. No respiratory distress. He has no wheezes. He has no rhonchi. He exhibits no tenderness.   Abdominal: Soft. Bowel sounds are normal. There is no tenderness. There is no rigidity, no rebound, no guarding, no CVA tenderness, no tenderness at McBurney's point and negative Cornejo's sign.   Genitourinary: Rectal exam shows external hemorrhoid and tenderness (brown stool, no gross blood).   Musculoskeletal: Normal range of motion. He exhibits no edema.   Neurological: He is alert and oriented to person, place, and time. He has normal strength.   Skin: Skin is warm. No pallor.   Psychiatric: He has a normal mood and affect.         ED Course   Procedures  Labs Reviewed   POCT URINALYSIS W/O SCOPE - Abnormal; Notable for the following:        Result Value    Glucose, UA Negative (*)     Bilirubin, UA Negative (*)     Ketones, UA Negative (*)     Blood, UA Negative (*)     Protein, UA Negative (*)     Nitrite, UA Negative (*)     Leukocytes, UA Negative (*)     All other components within normal limits   POCT CMP - Abnormal; Notable for the following:      POC Potassium 3.4 (*)     All other components within normal limits   POCT URINALYSIS W/O SCOPE   POCT CBC   POCT CMP             Medical Decision Making:   Differential Diagnosis:   Diverticulitis, malignancy, pyelonephritis, nephrolithiasis, others  Clinical Tests:   Lab Tests: Ordered and Reviewed       <> Summary of Lab: White count 6.6 H&H 14.2 and 43.9 platelets 157 MCV 82.4 RDW 13.9  Radiological Study: Ordered and Reviewed  ED Management:  Treated presumptively for diverticulitis                   ED Course      Labs Reviewed  Admission on 09/23/2017, Discharged on 09/23/2017   Component Date Value Ref Range Status    Glucose, UA 09/23/2017 Negative*  Final    Bilirubin, UA 09/23/2017 Negative*  Final    Ketones, UA 09/23/2017 Negative*  Final    Spec Grav UA 09/23/2017 1.010   Final    Blood, UA 09/23/2017 Negative*  Final    PH, UA 09/23/2017 7.0   Final    Protein, UA 09/23/2017 Negative*  Final    Urobilinogen, UA 09/23/2017 0.2  E.U./dL Final    Nitrite, UA 09/23/2017 Negative*  Final    Leukocytes, UA 09/23/2017 Negative*  Final    Color, UA 09/23/2017 Yellow   Final    Clarity, UA 09/23/2017 Clear   Final    Albumin, POC 09/23/2017 3.4  3.3 - 5.5 g/dL Final    Alkaline Phosphatase, POC 09/23/2017 117  42 - 141 U/L Final    ALT (SGPT), POC 09/23/2017 36  10 - 47 U/L Final    AST (SGOT), POC 09/23/2017 30  11 - 38 U/L Final    POC BUN 09/23/2017 11  7 - 22 mg/dL Final    Calcium, POC 09/23/2017 8.2  8.0 - 10.3 mg/dL Final    POC Chloride 09/23/2017 99  98 - 108 mmol/L Final    POC Creatinine 09/23/2017 0.9  0.6 - 1.2 mg/dL Final    POC Glucose 09/23/2017 103  73 - 118 mg/dL Final    POC Potassium 09/23/2017 3.4* 3.6 - 5.1 mmol/L Final    POC Sodium 09/23/2017 144  128 - 145 mmol/L Final    Bilirubin 09/23/2017 0.6  0.2 - 1.6 mg/dL Final    POC TCO2 09/23/2017 26  18 - 33 mmol/L Final    Protein 09/23/2017 7.1  6.4 - 8.1 g/dL Final        Imaging Reviewed    Imaging Results           CT Renal Stone Study ABD Pelvis WO (Final result)  Result time 09/23/17 05:01:23    Final result by Darren Khalil MD (09/23/17 05:01:23)                 Impression:        No acute intra-abdominal/pelvic abnormalities to account for the reported history of groin pain.    Findings include:  - Small sliding-type hiatal hernia.  - Prominent aortic atherosclerosis.      Electronically signed by: DARREN KHALIL MD  Date:     09/23/17  Time:    05:01              Narrative:    Technique: 5-mm axial images were obtained through the abdomen and pelvis without the use of IV contrast.  Images were submitted for coronal and sagittal reformats.    Comparison: CT 12/01/2010.    Findings:    The visualized heart is unremarkable.  No pericardial effusion.    The visualized lungs are clear.  No pleural effusions.    Evaluation of the intra-abdominal/pelvic structures is limited due to the lack of IV and enteric contrast.    The liver is normal in size. No focal hypoattenuating masses. No intrahepatic biliary ductal dilatation.    The gallbladder and CBD are within normal limits.    There is a small sliding-type hiatal hernia.  The stomach is otherwise unremarkable.    The duodenum, spleen, pancreas and adrenal glands are normal.    The kidneys are normal in size and location.  No hydronephrosis or hydroureter.  The bladder is fluid filled and unremarkable. The prostate is unremarkable.    The small bowel is normal in caliber.  The colon is unremarkable.  The appendix is not seen.  No obstruction or inflammatory changes.    There is no ascites, free fluid, or intraperitoneal free air.  No abdominal or pelvic lymphadenopathy.  The small bowel mesentery is unremarkable.    The abdominal aorta tapers normally with moderate atherosclerotic calcification.    Subcutaneous soft tissues are within normal limits.    Bones are unremarkable.  No acute fractures or dislocations.                              Medications given in  ED    Medications   0.9%  NaCl infusion (0 mLs Intravenous Stopped 9/23/17 0558)       Discharge Medications     Discharge Medication List as of 9/23/2017  6:12 AM      START taking these medications    Details   ciprofloxacin HCl (CIPRO) 500 MG tablet Take 1 tablet (500 mg total) by mouth 2 (two) times daily., Starting Sat 9/23/2017, Until Tue 10/3/2017, Normal      hydrocortisone-pramoxine (PROCTOFOAM-HS) rectal foam Place 1 applicator rectally 2 (two) times daily., Starting Sat 9/23/2017, Normal      magnesium citrate solution Take 296 mLs by mouth once as needed. For constipation, Starting Sat 9/23/2017, Until Sat 9/23/2017, OTC      metronidazole (FLAGYL) 500 MG tablet Take 1 tablet (500 mg total) by mouth every 12 (twelve) hours. DO NOT DRINK ALCOHOL WHILE TAKING THIS MEDICATION, Starting Sat 9/23/2017, Until Tue 10/3/2017, Normal         CONTINUE these medications which have NOT CHANGED    Details   alprazolam (XANAX) 0.5 MG tablet Take 0.5 mg by mouth 3 (three) times daily., Until Discontinued, Historical Med      amlodipine (NORVASC) 10 MG tablet Take 10 mg by mouth once daily., Until Discontinued, Historical Med      aspirin (ECOTRIN) 81 MG EC tablet Take 81 mg by mouth once daily., Until Discontinued, Historical Med      carvedilol (COREG) 25 MG tablet Take 25 mg by mouth 2 (two) times daily with meals., Until Discontinued, Historical Med      chlorthalidone (HYGROTEN) 25 MG Tab Take 25 mg by mouth once daily., Until Discontinued, Historical Med      diltiazem (DILACOR XR) 240 MG CDCR Take 180 mg by mouth once daily., Until Discontinued, Historical Med      doxazosin (CARDURA) 4 MG tablet Take 4 mg by mouth every 12 (twelve) hours., Until Discontinued, Historical Med      irbesartan (AVAPRO) 300 MG tablet Take 300 mg by mouth every evening., Until Discontinued, Historical Med      nortriptyline (PAMELOR) 50 MG capsule Take 50 mg by mouth every evening., Until Discontinued, Historical Med      pantoprazole  (PROTONIX) 40 MG tablet Take 40 mg by mouth once daily., Until Discontinued, Historical Med      potassium chloride (KLOR-CON) 20 mEq Pack Take 20 mEq by mouth 4 (four) times daily., Until Discontinued, Historical Med      sildenafil (VIAGRA) 100 MG tablet Take 100 mg by mouth daily as needed., Until Discontinued, Historical Med      triamterene-hydrochlorothiazide 37.5-25 mg (MAXZIDE-25) 37.5-25 mg per tablet Take 1 tablet by mouth once daily., Until Discontinued, Historical Med      albuterol 90 mcg/actuation inhaler Inhale 2 puffs into the lungs every 6 (six) hours as needed for Wheezing or Shortness of Breath., Starting 5/19/2017, Until Discontinued, Normal      chlorpheniramine (CHLOR-TRIMETON) 4 mg tablet Take 1 tablet (4 mg total) by mouth every 6 (six) hours as needed (runny nose and sinus congestion)., Starting 5/19/2017, Until Discontinued, Normal      hydrocodone-chlorpheniramine (TUSSIONEX) 10-8 mg/5 mL suspension Take 5 mLs by mouth nightly as needed for Cough or Congestion., Starting 5/19/2017, Until Discontinued, Print      olmesartan (BENICAR) 40 MG tablet Take 40 mg by mouth once daily., Until Discontinued, Historical Med                   Patient discharged to home in stable condition with instructions to:   1. Please take all meds as prescribed.  2. Follow-up with your primary care doctor   3. Return precautions discussed and patient and/or family/caretaker understands to return to the emergency room for any concerns including worsening of your current symptoms, fever, chills, night sweats, worsening pain, chest pain, shortness of breath, nausea, vomiting, diarrhea, bleeding, headache, difficulty talking, visual disturbances, weakness, numbness or any other acute concerns    Note was created using voice recognition software. Note may have occasional typographical errors that may not have been identified and edited despite good claudio initial review prior to signing.    Clinical Impression:   The  primary encounter diagnosis was LLQ abdominal pain. Diagnoses of Constipation, unspecified constipation type and External hemorrhoids were also pertinent to this visit.                           Jose Eduardo Sawyer MD  10/06/17 9958

## 2017-11-06 ENCOUNTER — HOSPITAL ENCOUNTER (EMERGENCY)
Facility: OTHER | Age: 57
Discharge: HOME | End: 2017-11-06
Attending: EMERGENCY MEDICINE
Payer: COMMERCIAL

## 2017-11-06 VITALS
DIASTOLIC BLOOD PRESSURE: 84 MMHG | HEART RATE: 89 BPM | SYSTOLIC BLOOD PRESSURE: 135 MMHG | OXYGEN SATURATION: 96 % | TEMPERATURE: 98 F | RESPIRATION RATE: 18 BRPM

## 2017-11-06 DIAGNOSIS — L03.031 CELLULITIS OF GREAT TOE, RIGHT: Primary | ICD-10-CM

## 2017-11-06 PROCEDURE — 99283 EMERGENCY DEPT VISIT LOW MDM: CPT

## 2017-11-06 PROCEDURE — 63600175 PHARM REV CODE 636 W HCPCS: Performed by: EMERGENCY MEDICINE

## 2017-11-06 PROCEDURE — 90471 IMMUNIZATION ADMIN: CPT | Performed by: EMERGENCY MEDICINE

## 2017-11-06 PROCEDURE — 90715 TDAP VACCINE 7 YRS/> IM: CPT | Performed by: EMERGENCY MEDICINE

## 2017-11-06 RX ORDER — HYDROCODONE BITARTRATE AND ACETAMINOPHEN 5; 325 MG/1; MG/1
1 TABLET ORAL EVERY 4 HOURS PRN
Qty: 6 TABLET | Refills: 0 | Status: SHIPPED | OUTPATIENT
Start: 2017-11-06

## 2017-11-06 RX ORDER — IBUPROFEN 600 MG/1
600 TABLET ORAL EVERY 6 HOURS PRN
Qty: 20 TABLET | Refills: 0 | OUTPATIENT
Start: 2017-11-06 | End: 2019-04-02

## 2017-11-06 RX ORDER — ONDANSETRON 8 MG/1
8 TABLET, ORALLY DISINTEGRATING ORAL EVERY 6 HOURS PRN
Qty: 30 TABLET | Refills: 0 | Status: SHIPPED | OUTPATIENT
Start: 2017-11-06 | End: 2017-11-08

## 2017-11-06 RX ORDER — MUPIROCIN 20 MG/G
OINTMENT TOPICAL 3 TIMES DAILY
Qty: 1 TUBE | Refills: 0 | Status: SHIPPED | OUTPATIENT
Start: 2017-11-06 | End: 2017-11-16

## 2017-11-06 RX ORDER — CLINDAMYCIN HYDROCHLORIDE 150 MG/1
300 CAPSULE ORAL 4 TIMES DAILY
Qty: 80 CAPSULE | Refills: 0 | Status: SHIPPED | OUTPATIENT
Start: 2017-11-06 | End: 2017-11-16

## 2017-11-06 RX ADMIN — CLOSTRIDIUM TETANI TOXOID ANTIGEN (FORMALDEHYDE INACTIVATED), CORYNEBACTERIUM DIPHTHERIAE TOXOID ANTIGEN (FORMALDEHYDE INACTIVATED), BORDETELLA PERTUSSIS TOXOID ANTIGEN (GLUTARALDEHYDE INACTIVATED), BORDETELLA PERTUSSIS FILAMENTOUS HEMAGGLUTININ ANTIGEN (FORMALDEHYDE INACTIVATED), BORDETELLA PERTUSSIS PERTACTIN ANTIGEN, AND BORDETELLA PERTUSSIS FIMBRIAE 2/3 ANTIGEN 0.5 ML: 5; 2; 2.5; 5; 3; 5 INJECTION, SUSPENSION INTRAMUSCULAR at 09:11

## 2017-11-06 NOTE — ED PROVIDER NOTES
Encounter Date: 11/6/2017       History     Chief Complaint   Patient presents with    Toe Pain     Mundo Galan Sr. is a 57 y.o. male who presents to the Emergency Department with  redness, pain, and swelling to right great toe.  he reports a mild pain to his right great toe for the last few days.  Patient states he poked his painful toe with a needle and pus came out a few days ago.  It was feeling better but now the toe is red.  Patient right is worried because now the whole toe looks red.  Patient states it doesn't hurt much when he touches it is a little painful when he walks on it.  Patient's taken nothing for pain.  he reports a history of high blood pressure.  He takes his medicines as prescribed.      The history is provided by the patient.     Review of patient's allergies indicates:   Allergen Reactions    Benicar [olmesartan] Swelling    Codeine Nausea And Vomiting     Past Medical History:   Diagnosis Date    Anticoagulant long-term use     Anxiety     DVT (deep venous thrombosis)     GERD (gastroesophageal reflux disease)     Hypertension      Past Surgical History:   Procedure Laterality Date    KNEE SURGERY      ROTATOR CUFF REPAIR       Family History   Problem Relation Age of Onset    Hyperlipidemia Mother     Stroke Mother     Heart disease Mother     Arthritis Mother     Hyperlipidemia Father     Stroke Father      Social History   Substance Use Topics    Smoking status: Never Smoker    Smokeless tobacco: Never Used    Alcohol use No     Review of Systems   Constitutional: Negative for fever.   HENT: Negative for sore throat.    Respiratory: Negative for shortness of breath.    Cardiovascular: Negative for chest pain.   Gastrointestinal: Negative for nausea.   Genitourinary: Negative for dysuria.   Musculoskeletal: Negative for back pain.   Skin: Positive for rash and wound.   Neurological: Negative for weakness.   Hematological: Does not bruise/bleed easily.   All other  systems reviewed and are negative.      Physical Exam     Initial Vitals [11/06/17 0811]   BP Pulse Resp Temp SpO2   135/84 89 18 98.2 °F (36.8 °C) 96 %      MAP       101         Physical Exam    Nursing note and vitals reviewed.  Constitutional: He appears well-developed and well-nourished. He is not diaphoretic. No distress.   HENT:   Head: Normocephalic and atraumatic.   Right Ear: External ear normal.   Left Ear: External ear normal.   Nose: Nose normal.   Eyes: EOM are normal. Pupils are equal, round, and reactive to light.   Neck: Normal range of motion. Neck supple.   Cardiovascular: Normal rate, regular rhythm and intact distal pulses.   Pulmonary/Chest: Breath sounds normal. No stridor. No respiratory distress.   Musculoskeletal: Normal range of motion. He exhibits tenderness. He exhibits no edema.   Neurological: He is alert. He has normal strength and normal reflexes. No cranial nerve deficit.   Skin: Skin is warm and dry. Capillary refill takes less than 2 seconds. There is erythema.        Erythema to the lateral right great toe.  No fluctuance.   Psychiatric: He has a normal mood and affect.         ED Course   Procedures  Labs Reviewed - No data to display                            ED Course        Medical decision making   Chief complaint: Redness pain and swelling and drainage to right great toe  Differential diagnosis: Cellulitis, abscess, paronychia, and toe pain  Treatment in the ED Physical Exam  Return in 2 days for wound check or follow-up with PCP in 2 days   Fill and take prescriptions as directed.  Return to the ED if symptoms worsen or do not resolve.   Answered questions and discussed discharge plan.        Clinical Impression:   The encounter diagnosis was Cellulitis of great toe, right.                           Cyndy Jim DO  11/08/17 9826

## 2017-11-07 ENCOUNTER — HOSPITAL ENCOUNTER (EMERGENCY)
Facility: OTHER | Age: 57
Discharge: HOME | End: 2017-11-07
Attending: EMERGENCY MEDICINE
Payer: COMMERCIAL

## 2017-11-07 VITALS
SYSTOLIC BLOOD PRESSURE: 200 MMHG | HEIGHT: 74 IN | WEIGHT: 315 LBS | HEART RATE: 103 BPM | DIASTOLIC BLOOD PRESSURE: 94 MMHG | TEMPERATURE: 98 F | OXYGEN SATURATION: 99 % | RESPIRATION RATE: 18 BRPM | BODY MASS INDEX: 40.43 KG/M2

## 2017-11-07 DIAGNOSIS — K12.2 UVULITIS: Primary | ICD-10-CM

## 2017-11-07 PROCEDURE — 96372 THER/PROPH/DIAG INJ SC/IM: CPT

## 2017-11-07 PROCEDURE — 99283 EMERGENCY DEPT VISIT LOW MDM: CPT | Mod: 25

## 2017-11-07 PROCEDURE — 63600175 PHARM REV CODE 636 W HCPCS: Performed by: EMERGENCY MEDICINE

## 2017-11-07 PROCEDURE — 25000003 PHARM REV CODE 250: Performed by: EMERGENCY MEDICINE

## 2017-11-07 RX ORDER — PREDNISONE 20 MG/1
40 TABLET ORAL DAILY
Qty: 10 TABLET | Refills: 0 | Status: SHIPPED | OUTPATIENT
Start: 2017-11-07 | End: 2017-11-12

## 2017-11-07 RX ORDER — FAMOTIDINE 20 MG/1
20 TABLET, FILM COATED ORAL 2 TIMES DAILY
Qty: 20 TABLET | Refills: 0 | Status: SHIPPED | OUTPATIENT
Start: 2017-11-07 | End: 2017-11-07

## 2017-11-07 RX ORDER — FLUTICASONE PROPIONATE 50 MCG
2 SPRAY, SUSPENSION (ML) NASAL DAILY
Qty: 16 G | Refills: 0 | Status: SHIPPED | OUTPATIENT
Start: 2017-11-07 | End: 2019-04-02

## 2017-11-07 RX ORDER — DIPHENHYDRAMINE HYDROCHLORIDE 50 MG/ML
50 INJECTION INTRAMUSCULAR; INTRAVENOUS
Status: DISCONTINUED | OUTPATIENT
Start: 2017-11-07 | End: 2017-11-07

## 2017-11-07 RX ORDER — METHYLPREDNISOLONE SOD SUCC 125 MG
125 VIAL (EA) INJECTION
Status: COMPLETED | OUTPATIENT
Start: 2017-11-07 | End: 2017-11-07

## 2017-11-07 RX ORDER — FAMOTIDINE 20 MG/1
20 TABLET, FILM COATED ORAL 2 TIMES DAILY
Qty: 20 TABLET | Refills: 0 | Status: SHIPPED | OUTPATIENT
Start: 2017-11-07 | End: 2018-11-07

## 2017-11-07 RX ORDER — FAMOTIDINE 20 MG/1
20 TABLET, FILM COATED ORAL
Status: COMPLETED | OUTPATIENT
Start: 2017-11-07 | End: 2017-11-07

## 2017-11-07 RX ADMIN — FAMOTIDINE 20 MG: 20 TABLET ORAL at 05:11

## 2017-11-07 RX ADMIN — METHYLPREDNISOLONE SODIUM SUCCINATE 125 MG: 125 INJECTION, POWDER, FOR SOLUTION INTRAMUSCULAR; INTRAVENOUS at 05:11

## 2017-11-07 NOTE — ED PROVIDER NOTES
Encounter Date: 11/7/2017       History     Chief Complaint   Patient presents with    Allergic Reaction     pt c/o feeling like throat closing and SOB s/p tdap reaction, pt states symptoms started @ 0440, pt c/o difficutly swallowing     Noticed uvula swelling ~ 440 AM today.  Had tetanus shot yesterday. Had sinus congestion today. Took Benadryl 50mg PO prior to arrival.  Patient states he was prescribed clindamycin for a toe infection but states he has not started taking this medicine yet.  He denies shortness of breath, difficulty breathing, fever, drooling  Multiple episodes of uvula swelling in past (3 episodes last year)      The history is provided by the patient.   Sore Throat   This is a recurrent problem. The current episode started 1 to 2 hours ago. The problem occurs constantly. The problem has not changed since onset.Pertinent negatives include no chest pain, no headaches and no shortness of breath. The symptoms are aggravated by swallowing.     Review of patient's allergies indicates:   Allergen Reactions    Benicar [olmesartan] Swelling    Codeine Nausea And Vomiting     Past Medical History:   Diagnosis Date    Anticoagulant long-term use     Anxiety     DVT (deep venous thrombosis)     GERD (gastroesophageal reflux disease)     Hypertension      Past Surgical History:   Procedure Laterality Date    KNEE SURGERY      ROTATOR CUFF REPAIR       Family History   Problem Relation Age of Onset    Hyperlipidemia Mother     Stroke Mother     Heart disease Mother     Arthritis Mother     Hyperlipidemia Father     Stroke Father      Social History   Substance Use Topics    Smoking status: Never Smoker    Smokeless tobacco: Never Used    Alcohol use No     Review of Systems   Constitutional: Negative for chills and fever.   HENT: Positive for sore throat.    Eyes: Negative.    Respiratory: Negative for cough, shortness of breath and stridor.    Cardiovascular: Negative for chest pain and  palpitations.   Gastrointestinal: Negative for nausea and vomiting.   Genitourinary: Negative for dysuria and hematuria.   Musculoskeletal: Negative.    Skin: Negative.    Neurological: Negative for dizziness and headaches.   Psychiatric/Behavioral: Negative.    All other systems reviewed and are negative.      Physical Exam     Initial Vitals   BP Pulse Resp Temp SpO2   -- -- -- -- --      MAP       --         Physical Exam    Nursing note and vitals reviewed.  Constitutional: He appears well-developed and well-nourished. He is not diaphoretic. No distress.   HENT:   Head: Normocephalic and atraumatic.   Mouth/Throat: Uvula is midline, oropharynx is clear and moist and mucous membranes are normal. No trismus in the jaw. Uvula swelling present.       There is no drooling or pooling of oral secretions.   Eyes: Conjunctivae are normal. Pupils are equal, round, and reactive to light.   Neck: Normal range of motion, full passive range of motion without pain and phonation normal. Neck supple. No stridor present.   Cardiovascular: Regular rhythm and intact distal pulses.   Pulmonary/Chest: No accessory muscle usage. No tachypnea. No respiratory distress.   Abdominal: He exhibits no distension. There is no tenderness.   Musculoskeletal: Normal range of motion. He exhibits no tenderness.   Neurological: He is alert and oriented to person, place, and time.   Skin: Skin is warm and intact.   Psychiatric: He has a normal mood and affect.         ED Course   Procedures  Labs Reviewed - No data to display          Medical Decision Making:   ED Management:  Symptoms improving. Airway patent. No respiratory distress. Will discharge.                    ED Course as of Dec 07 0146   Tue Nov 07, 2017   0554 Uvula swelling improved. Patient states he feels like it's easier to swallow.  [DL]      ED Course User Index  [DL] Jose Eduardo Sawyer MD     Labs Reviewed  No visits with results within 1 Day(s) from this visit.   Latest known visit  with results is:   Admission on 09/23/2017, Discharged on 09/23/2017   Component Date Value Ref Range Status    Glucose, UA 09/23/2017 Negative*  Final    Bilirubin, UA 09/23/2017 Negative*  Final    Ketones, UA 09/23/2017 Negative*  Final    Spec Grav UA 09/23/2017 1.010   Final    Blood, UA 09/23/2017 Negative*  Final    PH, UA 09/23/2017 7.0   Final    Protein, UA 09/23/2017 Negative*  Final    Urobilinogen, UA 09/23/2017 0.2  E.U./dL Final    Nitrite, UA 09/23/2017 Negative*  Final    Leukocytes, UA 09/23/2017 Negative*  Final    Color, UA 09/23/2017 Yellow   Final    Clarity, UA 09/23/2017 Clear   Final    Albumin, POC 09/23/2017 3.4  3.3 - 5.5 g/dL Final    Alkaline Phosphatase, POC 09/23/2017 117  42 - 141 U/L Final    ALT (SGPT), POC 09/23/2017 36  10 - 47 U/L Final    AST (SGOT), POC 09/23/2017 30  11 - 38 U/L Final    POC BUN 09/23/2017 11  7 - 22 mg/dL Final    Calcium, POC 09/23/2017 8.2  8.0 - 10.3 mg/dL Final    POC Chloride 09/23/2017 99  98 - 108 mmol/L Final    POC Creatinine 09/23/2017 0.9  0.6 - 1.2 mg/dL Final    POC Glucose 09/23/2017 103  73 - 118 mg/dL Final    POC Potassium 09/23/2017 3.4* 3.6 - 5.1 mmol/L Final    POC Sodium 09/23/2017 144  128 - 145 mmol/L Final    Bilirubin 09/23/2017 0.6  0.2 - 1.6 mg/dL Final    POC TCO2 09/23/2017 26  18 - 33 mmol/L Final    Protein 09/23/2017 7.1  6.4 - 8.1 g/dL Final        I  Medications given in ED    Medications   methylPREDNISolone sodium succinate injection 125 mg (125 mg Intramuscular Given 11/7/17 0528)   famotidine tablet 20 mg (20 mg Oral Given 11/7/17 0528)       Discharge Medications     Discharge Medication List as of 11/7/2017  6:03 AM      START taking these medications    Details   fluticasone (FLONASE) 50 mcg/actuation nasal spray 2 sprays by Each Nare route once daily., Starting Tue 11/7/2017, Normal      predniSONE (DELTASONE) 20 MG tablet Take 2 tablets (40 mg total) by mouth once daily., Starting Tue  11/7/2017, Until Sun 11/12/2017, Normal      famotidine (PEPCID) 20 MG tablet Take 1 tablet (20 mg total) by mouth 2 (two) times daily., Starting Tue 11/7/2017, Until Wed 11/7/2018, Normal         CONTINUE these medications which have NOT CHANGED    Details   albuterol 90 mcg/actuation inhaler Inhale 2 puffs into the lungs every 6 (six) hours as needed for Wheezing or Shortness of Breath., Starting 5/19/2017, Until Discontinued, Normal      alprazolam (XANAX) 0.5 MG tablet Take 0.5 mg by mouth 3 (three) times daily., Until Discontinued, Historical Med      amlodipine (NORVASC) 10 MG tablet Take 10 mg by mouth once daily., Until Discontinued, Historical Med      aspirin (ECOTRIN) 81 MG EC tablet Take 81 mg by mouth once daily., Until Discontinued, Historical Med      carvedilol (COREG) 25 MG tablet Take 25 mg by mouth 2 (two) times daily with meals., Until Discontinued, Historical Med      chlorpheniramine (CHLOR-TRIMETON) 4 mg tablet Take 1 tablet (4 mg total) by mouth every 6 (six) hours as needed (runny nose and sinus congestion)., Starting 5/19/2017, Until Discontinued, Normal      chlorthalidone (HYGROTEN) 25 MG Tab Take 25 mg by mouth once daily., Until Discontinued, Historical Med      clindamycin (CLEOCIN) 150 MG capsule Take 2 capsules (300 mg total) by mouth 4 (four) times daily., Starting Mon 11/6/2017, Until u 11/16/2017, Print      diltiazem (DILACOR XR) 240 MG CDCR Take 180 mg by mouth once daily., Until Discontinued, Historical Med      doxazosin (CARDURA) 4 MG tablet Take 4 mg by mouth every 12 (twelve) hours., Until Discontinued, Historical Med      hydrocodone-acetaminophen 5-325mg (NORCO) 5-325 mg per tablet Take 1 tablet by mouth every 4 (four) hours as needed for Pain (As needed for severe 10 out of 10 pain)., Starting Mon 11/6/2017, Print      hydrocodone-chlorpheniramine (TUSSIONEX) 10-8 mg/5 mL suspension Take 5 mLs by mouth nightly as needed for Cough or Congestion., Starting 5/19/2017,  Until Discontinued, Print      hydrocortisone-pramoxine (PROCTOFOAM-HS) rectal foam Place 1 applicator rectally 2 (two) times daily., Starting Sat 9/23/2017, Normal      ibuprofen (ADVIL,MOTRIN) 600 MG tablet Take 1 tablet (600 mg total) by mouth every 6 (six) hours as needed for Pain., Starting Mon 11/6/2017, Print      irbesartan (AVAPRO) 300 MG tablet Take 300 mg by mouth every evening., Until Discontinued, Historical Med      mupirocin (BACTROBAN) 2 % ointment Apply topically 3 (three) times daily., Starting Mon 11/6/2017, Until Thu 11/16/2017, Print      nortriptyline (PAMELOR) 50 MG capsule Take 50 mg by mouth every evening., Until Discontinued, Historical Med      olmesartan (BENICAR) 40 MG tablet Take 40 mg by mouth once daily., Until Discontinued, Historical Med      ondansetron (ZOFRAN-ODT) 8 MG TbDL Take 1 tablet (8 mg total) by mouth every 6 (six) hours as needed., Starting Mon 11/6/2017, Until Wed 11/8/2017, Print      pantoprazole (PROTONIX) 40 MG tablet Take 40 mg by mouth once daily., Until Discontinued, Historical Med      potassium chloride (KLOR-CON) 20 mEq Pack Take 20 mEq by mouth 4 (four) times daily., Until Discontinued, Historical Med      sildenafil (VIAGRA) 100 MG tablet Take 100 mg by mouth daily as needed., Until Discontinued, Historical Med      triamterene-hydrochlorothiazide 37.5-25 mg (MAXZIDE-25) 37.5-25 mg per tablet Take 1 tablet by mouth once daily., Until Discontinued, Historical Med                   Patient discharged to home in stable condition with instructions to:   1. Please take all meds as prescribed.  2. Follow-up with your primary care doctor   3. Return precautions discussed and patient and/or family/caretaker understands to return to the emergency room for any concerns including worsening of your current symptoms, fever, chills, night sweats, worsening pain, chest pain, shortness of breath, nausea, vomiting, diarrhea, bleeding, headache, difficulty talking, visual  disturbances, weakness, numbness or any other acute concerns    Note was created using voice recognition software. Note may have occasional typographical errors that may not have been identified and edited despite good claudio initial review prior to signing.    Clinical Impression:   The encounter diagnosis was Uvulitis.                           Jose Eduardo Sawyer MD  12/07/17 0154

## 2017-11-07 NOTE — ED TRIAGE NOTES
Patient states he is having an allergic reaction to a TB shot he received yesterday.   He states he feels like his throat is swollen.    LOC: The patient is awake and alert; oriented x 3 and speaking appropriately.  APPEARANCE: Patient resting comfortably, patient is clean and well groomed  SKIN: warm and dry, normal skin turgor & moist mucus membranes, skin intact, no breakdown noted.  MUSCULOSKELETAL: Patient moving all extremities well, no obvious swelling or deformities noted  RESPIRATORY: Airway is open and patent, breath sounds clear throughout all lung fields; respirations are spontaneous, normal effort and rate  CARDIAC: Patient has a normal rate, no peripheral edema noted, capillary refill < 3 seconds; No complaints of chest pain   ABDOMEN: Soft and non tender to palpation, no distention noted. Bowel sounds present x 4

## 2019-04-02 ENCOUNTER — HOSPITAL ENCOUNTER (EMERGENCY)
Facility: HOSPITAL | Age: 59
Discharge: HOME | End: 2019-04-02
Attending: INTERNAL MEDICINE
Payer: COMMERCIAL

## 2019-04-02 VITALS
RESPIRATION RATE: 20 BRPM | OXYGEN SATURATION: 100 % | HEART RATE: 94 BPM | BODY MASS INDEX: 40.43 KG/M2 | WEIGHT: 315 LBS | HEIGHT: 74 IN | DIASTOLIC BLOOD PRESSURE: 79 MMHG | TEMPERATURE: 99 F | SYSTOLIC BLOOD PRESSURE: 162 MMHG

## 2019-04-02 DIAGNOSIS — B34.9 ACUTE VIRAL SYNDROME: Primary | ICD-10-CM

## 2019-04-02 LAB
CTP QC/QA: YES
S PYO RRNA THROAT QL PROBE: NEGATIVE

## 2019-04-02 PROCEDURE — 87081 CULTURE SCREEN ONLY: CPT

## 2019-04-02 PROCEDURE — 87880 STREP A ASSAY W/OPTIC: CPT | Mod: ER

## 2019-04-02 PROCEDURE — 99284 EMERGENCY DEPT VISIT MOD MDM: CPT | Mod: ER

## 2019-04-02 PROCEDURE — 87147 CULTURE TYPE IMMUNOLOGIC: CPT

## 2019-04-02 RX ORDER — IBUPROFEN 800 MG/1
800 TABLET ORAL EVERY 8 HOURS PRN
Qty: 30 TABLET | Refills: 0 | OUTPATIENT
Start: 2019-04-02 | End: 2019-07-21

## 2019-04-02 RX ORDER — AZELASTINE 1 MG/ML
2 SPRAY, METERED NASAL 2 TIMES DAILY
Qty: 30 ML | Refills: 0 | Status: SHIPPED | OUTPATIENT
Start: 2019-04-02 | End: 2019-05-27

## 2019-04-02 RX ORDER — FLUTICASONE PROPIONATE 50 MCG
2 SPRAY, SUSPENSION (ML) NASAL DAILY
Qty: 15 G | Refills: 0 | Status: SHIPPED | OUTPATIENT
Start: 2019-04-02

## 2019-04-02 NOTE — ED PROVIDER NOTES
Encounter Date: 4/2/2019       History     Chief Complaint   Patient presents with    Generalized Body Aches     pt c/o generalized body aches x4 days, also with throat pain x 2 days, denies other complaints, vs stable. reports use of motrin last pm with no relieve     59-year-old male presents to the emergency department complaining of generalized body aches, nasal congestion and sore throat x2 days.  He denies fever/chills, nausea/vomiting.  Past medical history significant for hypertension, anxiety DVT and GERD.        Review of patient's allergies indicates:   Allergen Reactions    Benicar [olmesartan] Swelling    Codeine Nausea And Vomiting     Past Medical History:   Diagnosis Date    Anticoagulant long-term use     Anxiety     DVT (deep venous thrombosis)     GERD (gastroesophageal reflux disease)     Hypertension      Past Surgical History:   Procedure Laterality Date    KNEE SURGERY      ROTATOR CUFF REPAIR       Family History   Problem Relation Age of Onset    Hyperlipidemia Mother     Stroke Mother     Heart disease Mother     Arthritis Mother     Hyperlipidemia Father     Stroke Father      Social History     Tobacco Use    Smoking status: Never Smoker    Smokeless tobacco: Never Used   Substance Use Topics    Alcohol use: No    Drug use: No     Review of Systems   Constitutional: Negative for fever.   HENT: Positive for congestion, postnasal drip and sore throat.    Gastrointestinal: Negative for diarrhea, nausea and vomiting.   Musculoskeletal: Positive for myalgias.   All other systems reviewed and are negative.      Physical Exam     Initial Vitals [04/02/19 0638]   BP Pulse Resp Temp SpO2   (!) 171/90 94 20 98.8 °F (37.1 °C) 100 %      MAP       --         Physical Exam    Nursing note and vitals reviewed.  Constitutional: He appears well-developed and well-nourished. No distress.   HENT:   Head: Normocephalic and atraumatic.   Right Ear: External ear normal.   Left Ear:  External ear normal.   Clear postnasal drip enlarged nasal turbinates, posterior oropharyngeal erythema without exudate or edema   Eyes: Conjunctivae and EOM are normal. Pupils are equal, round, and reactive to light.   Neck: Normal range of motion. Neck supple.   Cardiovascular: Normal rate and regular rhythm.   Pulmonary/Chest: Breath sounds normal. No respiratory distress. He has no wheezes.   Abdominal: Soft. Bowel sounds are normal. He exhibits no distension. There is no tenderness.   Musculoskeletal: Normal range of motion.   Neurological: He is alert.   Skin: Skin is warm and dry.   Psychiatric: He has a normal mood and affect. Thought content normal.         ED Course   Procedures  Labs Reviewed   CULTURE, STREP A,  THROAT   POCT RAPID STREP A          Imaging Results    None          Medical Decision Making:   Initial Assessment:   59-year-old male presents to the emergency department complaining of generalized body aches, nasal congestion and sore throat x2 days.  He denies fever/chills, nausea/vomiting.  Past medical history significant for hypertension, anxiety DVT and GERD.                        Clinical Impression:       ICD-10-CM ICD-9-CM   1. Acute viral syndrome B34.9 079.99         Disposition:   Disposition: Discharged  Condition: Stable                        José Rosas MD  04/02/19 1025

## 2019-04-04 LAB — BACTERIA THROAT CULT: NORMAL

## 2019-07-21 ENCOUNTER — HOSPITAL ENCOUNTER (EMERGENCY)
Facility: HOSPITAL | Age: 59
Discharge: HOME | End: 2019-07-21
Attending: EMERGENCY MEDICINE
Payer: COMMERCIAL

## 2019-07-21 VITALS
BODY MASS INDEX: 40.43 KG/M2 | RESPIRATION RATE: 20 BRPM | OXYGEN SATURATION: 100 % | TEMPERATURE: 99 F | SYSTOLIC BLOOD PRESSURE: 139 MMHG | WEIGHT: 315 LBS | DIASTOLIC BLOOD PRESSURE: 64 MMHG | HEART RATE: 80 BPM | HEIGHT: 74 IN

## 2019-07-21 DIAGNOSIS — M25.512 LEFT SHOULDER PAIN: ICD-10-CM

## 2019-07-21 DIAGNOSIS — E87.6 HYPOKALEMIA: ICD-10-CM

## 2019-07-21 DIAGNOSIS — M54.9 UPPER BACK PAIN ON LEFT SIDE: ICD-10-CM

## 2019-07-21 DIAGNOSIS — M62.838 MUSCLE SPASM: Primary | ICD-10-CM

## 2019-07-21 LAB
ALBUMIN SERPL-MCNC: 3.4 G/DL (ref 3.3–5.5)
ALP SERPL-CCNC: 127 U/L (ref 42–141)
BILIRUB SERPL-MCNC: 0.6 MG/DL (ref 0.2–1.6)
BUN SERPL-MCNC: 11 MG/DL (ref 7–22)
CALCIUM SERPL-MCNC: 8.8 MG/DL (ref 8–10.3)
CHLORIDE SERPL-SCNC: 105 MMOL/L (ref 98–108)
CREAT SERPL-MCNC: 0.9 MG/DL (ref 0.6–1.2)
GLUCOSE SERPL-MCNC: 149 MG/DL (ref 73–118)
POC ALT (SGPT): 30 U/L (ref 10–47)
POC AST (SGOT): 33 U/L (ref 11–38)
POC B-TYPE NATRIURETIC PEPTIDE: <5 PG/ML (ref 0–100)
POC CARDIAC TROPONIN I: 0 NG/ML
POC PTINR: 1 (ref 0.9–1.2)
POC PTWBT: 12.3 SEC (ref 9.7–14.3)
POC TCO2: 26 MMOL/L (ref 18–33)
POTASSIUM BLD-SCNC: 3.5 MMOL/L (ref 3.6–5.1)
PROTEIN, POC: 7.2 G/DL (ref 6.4–8.1)
SAMPLE: NORMAL
SAMPLE: NORMAL
SODIUM BLD-SCNC: 138 MMOL/L (ref 128–145)

## 2019-07-21 PROCEDURE — 63600175 PHARM REV CODE 636 W HCPCS: Mod: ER | Performed by: EMERGENCY MEDICINE

## 2019-07-21 PROCEDURE — 99284 EMERGENCY DEPT VISIT MOD MDM: CPT | Mod: 25,ER

## 2019-07-21 PROCEDURE — 83880 ASSAY OF NATRIURETIC PEPTIDE: CPT | Mod: ER

## 2019-07-21 PROCEDURE — 96374 THER/PROPH/DIAG INJ IV PUSH: CPT | Mod: ER

## 2019-07-21 PROCEDURE — 93005 ELECTROCARDIOGRAM TRACING: CPT | Mod: ER

## 2019-07-21 PROCEDURE — 84484 ASSAY OF TROPONIN QUANT: CPT | Mod: ER

## 2019-07-21 PROCEDURE — 93010 ELECTROCARDIOGRAM REPORT: CPT | Mod: ,,, | Performed by: INTERNAL MEDICINE

## 2019-07-21 PROCEDURE — 93010 EKG 12-LEAD: ICD-10-PCS | Mod: ,,, | Performed by: INTERNAL MEDICINE

## 2019-07-21 PROCEDURE — 85610 PROTHROMBIN TIME: CPT | Mod: ER

## 2019-07-21 PROCEDURE — 85025 COMPLETE CBC W/AUTO DIFF WBC: CPT | Mod: ER

## 2019-07-21 PROCEDURE — 25000003 PHARM REV CODE 250: Mod: ER | Performed by: EMERGENCY MEDICINE

## 2019-07-21 PROCEDURE — 80053 COMPREHEN METABOLIC PANEL: CPT | Mod: ER

## 2019-07-21 RX ORDER — DICLOFENAC SODIUM 10 MG/G
2 GEL TOPICAL 4 TIMES DAILY
Qty: 1 TUBE | Refills: 0 | Status: SHIPPED | OUTPATIENT
Start: 2019-07-21 | End: 2019-07-31

## 2019-07-21 RX ORDER — KETOROLAC TROMETHAMINE 30 MG/ML
15 INJECTION, SOLUTION INTRAMUSCULAR; INTRAVENOUS
Status: COMPLETED | OUTPATIENT
Start: 2019-07-21 | End: 2019-07-21

## 2019-07-21 RX ORDER — METHOCARBAMOL 750 MG/1
1500 TABLET, FILM COATED ORAL 3 TIMES DAILY PRN
Qty: 30 TABLET | Refills: 0 | Status: SHIPPED | OUTPATIENT
Start: 2019-07-21 | End: 2019-07-26

## 2019-07-21 RX ORDER — ACETAMINOPHEN 500 MG
1000 TABLET ORAL EVERY 6 HOURS PRN
Qty: 30 TABLET | Refills: 0 | OUTPATIENT
Start: 2019-07-21 | End: 2020-05-29

## 2019-07-21 RX ORDER — DIAZEPAM 5 MG/1
5 TABLET ORAL
Status: COMPLETED | OUTPATIENT
Start: 2019-07-21 | End: 2019-07-21

## 2019-07-21 RX ORDER — IBUPROFEN 600 MG/1
600 TABLET ORAL EVERY 6 HOURS PRN
Qty: 20 TABLET | Refills: 0 | OUTPATIENT
Start: 2019-07-21 | End: 2021-06-12

## 2019-07-21 RX ORDER — POTASSIUM CHLORIDE 20 MEQ/1
20 TABLET, EXTENDED RELEASE ORAL
Status: COMPLETED | OUTPATIENT
Start: 2019-07-21 | End: 2019-07-21

## 2019-07-21 RX ORDER — ASPIRIN 325 MG
325 TABLET ORAL
Status: COMPLETED | OUTPATIENT
Start: 2019-07-21 | End: 2019-07-21

## 2019-07-21 RX ADMIN — DIAZEPAM 5 MG: 5 TABLET ORAL at 07:07

## 2019-07-21 RX ADMIN — POTASSIUM CHLORIDE 20 MEQ: 1500 TABLET, EXTENDED RELEASE ORAL at 08:07

## 2019-07-21 RX ADMIN — KETOROLAC TROMETHAMINE 15 MG: 30 INJECTION, SOLUTION INTRAMUSCULAR; INTRAVENOUS at 08:07

## 2019-07-21 RX ADMIN — ASPIRIN 325 MG ORAL TABLET 325 MG: 325 PILL ORAL at 07:07

## 2019-07-21 NOTE — ED PROVIDER NOTES
Encounter Date: 7/21/2019       History     Chief Complaint   Patient presents with    Joint Pain     bad muscle pull in the lt. shoulder, thinks it is the way he is sleeping.   started a couple of days ago.       59-year-old male chief complaint severe left upper back pain radiating to the left shoulder.  Pain started 4 days ago.  Patient states pain is getting worse every day.  Patient states the pain is so bad he cannot sleep at night.  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  On Friday he did lift something but it did not feel very heavy when he lifted it.  Worsening dull pain that will not let him sleep. Patient states he had a stent placed in April and takes Brilinta and hypertension meds daily    The history is provided by the patient.     Review of patient's allergies indicates:   Allergen Reactions    Benicar [olmesartan] Swelling    Codeine Nausea And Vomiting     Past Medical History:   Diagnosis Date    Anticoagulant long-term use     Anxiety     DVT (deep venous thrombosis)     GERD (gastroesophageal reflux disease)     Hypertension      Past Surgical History:   Procedure Laterality Date    ABDOMINAL AORTA STENT      heart on the rt.     KNEE SURGERY      ROTATOR CUFF REPAIR       Family History   Problem Relation Age of Onset    Hyperlipidemia Mother     Stroke Mother     Heart disease Mother     Arthritis Mother     Hyperlipidemia Father     Stroke Father      Social History     Tobacco Use    Smoking status: Never Smoker    Smokeless tobacco: Never Used   Substance Use Topics    Alcohol use: No    Drug use: No     Review of Systems   Constitutional: Negative for fever.   HENT: Negative for sore throat.    Respiratory: Negative for shortness of breath.    Cardiovascular: Negative for chest pain.   Gastrointestinal: Negative for nausea.   Genitourinary: Negative for dysuria.   Musculoskeletal:  Positive for arthralgias and myalgias. Negative for back pain.   Skin: Negative for rash.   Neurological: Negative for weakness.   Hematological: Does not bruise/bleed easily.   All other systems reviewed and are negative.      Physical Exam     Initial Vitals [07/21/19 0730]   BP Pulse Resp Temp SpO2   (!) 152/85 89 18 97.9 °F (36.6 °C) 99 %      MAP       --         Physical Exam    Nursing note and vitals reviewed.  Constitutional: He appears well-developed and well-nourished. He is not diaphoretic. No distress.   HENT:   Head: Normocephalic and atraumatic.   Right Ear: External ear normal.   Left Ear: External ear normal.   Nose: Nose normal.   Mouth/Throat: Oropharynx is clear and moist.   Eyes: EOM are normal. Pupils are equal, round, and reactive to light. Right eye exhibits no discharge. Left eye exhibits no discharge.   Neck: Normal range of motion. Neck supple. JVD present.   Cardiovascular: Normal rate, regular rhythm, normal heart sounds and intact distal pulses. Exam reveals no gallop and no friction rub.    No murmur heard.  Pulmonary/Chest: Breath sounds normal. No respiratory distress. He has no wheezes. He has no rhonchi. He has no rales. He exhibits no tenderness.   Abdominal: Soft. Bowel sounds are normal. He exhibits no distension and no mass. There is no tenderness. There is no rebound and no guarding.   Musculoskeletal: Normal range of motion.        Right shoulder: Normal. He exhibits normal range of motion, no tenderness, no bony tenderness, no deformity and no laceration.        Left shoulder: He exhibits pain and spasm. He exhibits no bony tenderness, normal pulse and normal strength.        Right elbow: He exhibits normal range of motion, no swelling and no laceration.        Left elbow: Normal. He exhibits normal range of motion, no swelling and no effusion.        Right wrist: Normal. He exhibits normal range of motion, no tenderness and no bony tenderness.        Left wrist: Normal. He  exhibits normal range of motion and no tenderness.        Cervical back: He exhibits pain and spasm. He exhibits normal range of motion, no tenderness and no bony tenderness.        Thoracic back: Normal. He exhibits normal range of motion, no tenderness and no bony tenderness.        Lumbar back: Normal. He exhibits normal range of motion, no tenderness and no bony tenderness.        Back:    Neurological: He is alert and oriented to person, place, and time. No cranial nerve deficit or sensory deficit.   Skin: Skin is warm. No rash noted. No pallor.   Psychiatric: He has a normal mood and affect.         ED Course   Procedures  Labs Reviewed   POCT CMP - Abnormal; Notable for the following components:       Result Value    POC Glucose 149 (*)     POC Potassium 3.5 (*)     All other components within normal limits   POCT B-TYPE NATRIURETIC PEPTIDE (BNP) - Abnormal; Notable for the following components:    POC B-Type Natriuretic Peptide <5.0 (*)     All other components within normal limits   TROPONIN ISTAT   POCT CBC   POCT CMP   POCT PROTIME-INR   POCT TROPONIN   POCT B-TYPE NATRIURETIC PEPTIDE (BNP)   ISTAT PROCEDURE         EKG Readings: (Independently Interpreted)   Initial Reading: No STEMI. Previous EKG: Compared with most recent EKG Previous EKG Date: When compared to prior EKG 05/19/2017 rate has decreased by 2 beats per minute today. Rhythm: Normal Sinus Rhythm. Heart Rate: 87. Conduction: 1st Degree AV Block. Axis: Normal. Other Impression: Abnormal EKG,        Imaging Results          X-Ray Chest PA And Lateral (Final result)  Result time 07/21/19 08:08:38    Final result by Zurdo Alvarado MD (07/21/19 08:08:38)                 Impression:      No acute abnormality.  No significant change.      Electronically signed by: Zurdo Alvarado MD  Date:    07/21/2019  Time:    08:08             Narrative:    EXAMINATION:  XR CHEST PA AND LATERAL    CLINICAL HISTORY:  Hypertension;    TECHNIQUE:  PA and  lateral views of the chest were performed.    COMPARISON:  May 21, 2017    FINDINGS:  Streaky opacities in left lower lung zone, probably atelectasis.  Lungs are otherwise clear.  No effusion or pneumothorax.    Unremarkable cardiomediastinal silhouette.    No acute bone abnormality.                                                 Medical decision making   Chief complaint:  Left upper  back pain radiating to left shoulder  Differential diagnosis:  STEMI, NSTEMI, muscle strain, muscle spasm, and hypertension.  Treatment in the ED Physical Exam, Toradol and Valium  Patient reports feeling better after medication.    Discussed labs, and imaging results.    BNP less than 5.  Troponin 0.00.  Potassium is 3.5 oral replacement given  Patient presents with chest pain for greater than 24 hours.  Troponin is negative.  No STEMI on EKG and no acute issues on chest x-ray. Chest pain unlikely to be cardiac in origin.  I recommend follow up with Cardiology in 1 day for further evaluation of chest pain. Discussed need to return to the ED if chest pain worsens or does not resolve.  Fill and take prescriptions as directed.  Return to the ED if symptoms worsen or do not resolve.   Answered questions and discussed discharge plan.    Patient feels better and is ready for discharge.  Follow up with PCP/specialist in 1 day.       Clinical Impression:       ICD-10-CM ICD-9-CM   1. Muscle spasm M62.838 728.85   2. Left shoulder pain M25.512 719.41   3. Upper back pain on left side M54.9 724.5   4. Hypokalemia E87.6 276.8                                Cyndy Jim,   07/21/19 1516       Cyndy Jim,   08/18/19 1350

## 2019-09-25 ENCOUNTER — HOSPITAL ENCOUNTER (EMERGENCY)
Facility: HOSPITAL | Age: 59
Discharge: HOME | End: 2019-09-25
Attending: EMERGENCY MEDICINE
Payer: COMMERCIAL

## 2019-09-25 VITALS
SYSTOLIC BLOOD PRESSURE: 145 MMHG | OXYGEN SATURATION: 95 % | DIASTOLIC BLOOD PRESSURE: 79 MMHG | TEMPERATURE: 100 F | RESPIRATION RATE: 18 BRPM | HEIGHT: 74 IN | HEART RATE: 108 BPM | BODY MASS INDEX: 40.43 KG/M2 | WEIGHT: 315 LBS

## 2019-09-25 DIAGNOSIS — K52.9 GASTROENTERITIS: Primary | ICD-10-CM

## 2019-09-25 PROCEDURE — 99284 EMERGENCY DEPT VISIT MOD MDM: CPT | Mod: 25,ER

## 2019-09-25 PROCEDURE — 96372 THER/PROPH/DIAG INJ SC/IM: CPT | Mod: ER

## 2019-09-25 PROCEDURE — 25000003 PHARM REV CODE 250: Mod: ER | Performed by: EMERGENCY MEDICINE

## 2019-09-25 PROCEDURE — 63600175 PHARM REV CODE 636 W HCPCS: Mod: ER | Performed by: EMERGENCY MEDICINE

## 2019-09-25 RX ORDER — SUCRALFATE 1 G/1
1 TABLET ORAL 4 TIMES DAILY
Qty: 28 TABLET | Refills: 0 | Status: SHIPPED | OUTPATIENT
Start: 2019-09-25

## 2019-09-25 RX ORDER — METOCLOPRAMIDE 10 MG/1
10 TABLET ORAL EVERY 6 HOURS PRN
Qty: 30 TABLET | Refills: 0 | Status: SHIPPED | OUTPATIENT
Start: 2019-09-25

## 2019-09-25 RX ORDER — ONDANSETRON 4 MG/1
4 TABLET, ORALLY DISINTEGRATING ORAL EVERY 6 HOURS PRN
Qty: 10 TABLET | Refills: 0 | Status: SHIPPED | OUTPATIENT
Start: 2019-09-25

## 2019-09-25 RX ORDER — METOCLOPRAMIDE HYDROCHLORIDE 5 MG/ML
10 INJECTION INTRAMUSCULAR; INTRAVENOUS
Status: COMPLETED | OUTPATIENT
Start: 2019-09-25 | End: 2019-09-25

## 2019-09-25 RX ORDER — ONDANSETRON 4 MG/1
8 TABLET, ORALLY DISINTEGRATING ORAL
Status: COMPLETED | OUTPATIENT
Start: 2019-09-25 | End: 2019-09-25

## 2019-09-25 RX ADMIN — ONDANSETRON 8 MG: 4 TABLET, ORALLY DISINTEGRATING ORAL at 07:09

## 2019-09-25 RX ADMIN — METOCLOPRAMIDE 10 MG: 5 INJECTION, SOLUTION INTRAMUSCULAR; INTRAVENOUS at 07:09

## 2019-09-26 NOTE — ED PROVIDER NOTES
Encounter Date: 9/25/2019    SCRIBE #1 NOTE: I, Alisson Iglesias, am scribing for, and in the presence of,  Dr. Cole. I have scribed the following portions of the note - Other sections scribed: HP, ROS, PE.       History     Chief Complaint   Patient presents with    Diarrhea     pt reports nausea and diarrhea that started last night with chills/bodyaches. he says hes been running low grade fever . denies abdominal pain    Nausea    Chills    Fever     Mundo Galan Sr. is a 59 y.o. male who presents to the ED complaining of nausea and diarrhea that started last night. Pt also reports subjective fever. Pt denies abdominal pain.       The history is provided by the patient. No  was used.     Review of patient's allergies indicates:   Allergen Reactions    Benicar [olmesartan] Swelling    Codeine Nausea And Vomiting     Past Medical History:   Diagnosis Date    Anticoagulant long-term use     Anxiety     DVT (deep venous thrombosis)     GERD (gastroesophageal reflux disease)     Hypertension      Past Surgical History:   Procedure Laterality Date    ABDOMINAL AORTA STENT      heart on the rt.     KNEE SURGERY      ROTATOR CUFF REPAIR       Family History   Problem Relation Age of Onset    Hyperlipidemia Mother     Stroke Mother     Heart disease Mother     Arthritis Mother     Hyperlipidemia Father     Stroke Father      Social History     Tobacco Use    Smoking status: Never Smoker    Smokeless tobacco: Never Used   Substance Use Topics    Alcohol use: No    Drug use: No     Review of Systems   Constitutional: Positive for fever (subjective).   HENT: Negative.  Negative for sore throat.    Eyes: Negative.  Negative for pain.   Respiratory: Negative.  Negative for shortness of breath.    Cardiovascular: Negative.  Negative for chest pain.   Gastrointestinal: Positive for diarrhea and nausea. Negative for abdominal pain and vomiting.   Genitourinary: Negative.  Negative for  dysuria.   Musculoskeletal: Negative.  Negative for back pain.   Skin: Negative.  Negative for rash.   Neurological: Negative.  Negative for headaches.   Hematological: Negative.    Psychiatric/Behavioral: Negative.    All other systems reviewed and are negative.      Physical Exam     Initial Vitals [09/25/19 1832]   BP Pulse Resp Temp SpO2   (!) 145/79 108 18 99.5 °F (37.5 °C) 95 %      MAP       --         Physical Exam    Nursing note and vitals reviewed.  Constitutional: He appears well-developed and well-nourished. He is Obese .   HENT:   Head: Normocephalic and atraumatic.   Eyes: Conjunctivae and EOM are normal.   Neck: Normal range of motion. Neck supple.   Cardiovascular: Normal rate and intact distal pulses.   Pulmonary/Chest: Effort normal. No respiratory distress.   Abdominal: Soft. There is no tenderness.   Musculoskeletal: Normal range of motion.   Neurological: He is alert and oriented to person, place, and time.   Skin: Skin is warm and dry.   Psychiatric: He has a normal mood and affect. His behavior is normal.         ED Course   Procedures  Labs Reviewed - No data to display       Imaging Results    None          Medical Decision Making:   History:   Old Medical Records: I decided to obtain old medical records.            Scribe Attestation:   Scribe #1: I performed the above scribed service and the documentation accurately describes the services I performed. I attest to the accuracy of the note.    This document was produced by a scribe under my direction and in my presence. I agree with the content of the note and have made any necessary edits.     Mundo Cole MD    09/27/2019 9:43 AM           Clinical Impression:     1. Gastroenteritis                                   Mundo Cole MD  09/27/19 0943

## 2020-04-26 ENCOUNTER — HOSPITAL ENCOUNTER (EMERGENCY)
Facility: HOSPITAL | Age: 60
Discharge: HOME | End: 2020-04-26
Attending: EMERGENCY MEDICINE
Payer: COMMERCIAL

## 2020-04-26 VITALS
TEMPERATURE: 99 F | SYSTOLIC BLOOD PRESSURE: 162 MMHG | OXYGEN SATURATION: 97 % | WEIGHT: 315 LBS | DIASTOLIC BLOOD PRESSURE: 90 MMHG | BODY MASS INDEX: 40.43 KG/M2 | HEART RATE: 88 BPM | RESPIRATION RATE: 16 BRPM | HEIGHT: 74 IN

## 2020-04-26 DIAGNOSIS — T78.3XXA ANGIOEDEMA, INITIAL ENCOUNTER: Primary | ICD-10-CM

## 2020-04-26 PROCEDURE — 99283 EMERGENCY DEPT VISIT LOW MDM: CPT | Mod: ER

## 2020-04-26 PROCEDURE — 63600175 PHARM REV CODE 636 W HCPCS: Mod: ER | Performed by: EMERGENCY MEDICINE

## 2020-04-26 RX ORDER — PREDNISONE 20 MG/1
80 TABLET ORAL
Status: COMPLETED | OUTPATIENT
Start: 2020-04-26 | End: 2020-04-26

## 2020-04-26 RX ORDER — PREDNISONE 20 MG/1
40 TABLET ORAL DAILY
Qty: 10 TABLET | Refills: 0 | Status: SHIPPED | OUTPATIENT
Start: 2020-04-26 | End: 2020-05-01

## 2020-04-26 RX ADMIN — PREDNISONE 80 MG: 20 TABLET ORAL at 06:04

## 2020-04-26 NOTE — ED PROVIDER NOTES
Encounter Date: 4/26/2020       History     Chief Complaint   Patient presents with    Oral Swelling     pt presents to ED with c/o uvela swelling. States this has happened before, due to the way he sleeps. NAD noted at this time     This patient has prior history of uvular swelling.  He relates that to his sleeping habitus.  Patient denies any other symptomatology especially shortness of breath or difficulty breathing.    The history is provided by the patient.     Review of patient's allergies indicates:   Allergen Reactions    Benicar [olmesartan] Swelling    Codeine Nausea And Vomiting     Past Medical History:   Diagnosis Date    Anticoagulant long-term use     Anxiety     DVT (deep venous thrombosis)     GERD (gastroesophageal reflux disease)     Hypertension      Past Surgical History:   Procedure Laterality Date    ABDOMINAL AORTA STENT      heart on the rt.     KNEE SURGERY      ROTATOR CUFF REPAIR       Family History   Problem Relation Age of Onset    Hyperlipidemia Mother     Stroke Mother     Heart disease Mother     Arthritis Mother     Hyperlipidemia Father     Stroke Father      Social History     Tobacco Use    Smoking status: Never Smoker    Smokeless tobacco: Never Used   Substance Use Topics    Alcohol use: No    Drug use: No     Review of Systems   Constitutional: Negative.    HENT: Negative.  Negative for trouble swallowing.    Eyes: Negative.    Respiratory: Negative.  Negative for shortness of breath.    Cardiovascular: Negative.    Gastrointestinal: Negative.    Endocrine: Negative.    Genitourinary: Negative.    Musculoskeletal: Negative.    Skin: Negative.    Allergic/Immunologic: Negative.    Neurological: Negative.    Hematological: Negative.    Psychiatric/Behavioral: Negative.    All other systems reviewed and are negative.      Physical Exam     Initial Vitals [04/26/20 0609]   BP Pulse Resp Temp SpO2   (!) 162/90 88 16 98.5 °F (36.9 °C) 97 %      MAP       --          Physical Exam    Nursing note and vitals reviewed.  Constitutional: Vital signs are normal. He is Obese . He is active and cooperative.   HENT:   Head: Normocephalic and atraumatic.   Mouth/Throat: Uvula is midline, oropharynx is clear and moist and mucous membranes are normal. Uvula swelling present.   Eyes: Conjunctivae, EOM and lids are normal. Pupils are equal, round, and reactive to light.   Neck: Trachea normal, normal range of motion, full passive range of motion without pain and phonation normal. Neck supple. No thyroid mass present. No stridor present. No spinous process tenderness and no muscular tenderness present. No edema, no erythema and normal range of motion present. No neck rigidity. No Brudzinski's sign and no Kernig's sign noted.   Cardiovascular: Normal rate, regular rhythm, S1 normal, S2 normal, normal heart sounds, intact distal pulses and normal pulses.   Pulmonary/Chest: Effort normal and breath sounds normal.   Abdominal: Soft. Normal appearance, normal aorta and bowel sounds are normal. There is no tenderness.   Musculoskeletal: Normal range of motion.   Lymphadenopathy:     He has no axillary adenopathy.   Neurological: He is alert and oriented to person, place, and time.   Skin: Skin is warm, dry and intact.   Psychiatric: He has a normal mood and affect. His speech is normal and behavior is normal. Judgment and thought content normal. Cognition and memory are normal.         ED Course   Procedures  Labs Reviewed - No data to display       Imaging Results    None                                          Clinical Impression:       ICD-10-CM ICD-9-CM   1. Angioedema, initial encounter T78.3XXA 995.1             ED Disposition Condition    Discharge Stable        ED Prescriptions     Medication Sig Dispense Start Date End Date Auth. Provider    predniSONE (DELTASONE) 20 MG tablet Take 2 tablets (40 mg total) by mouth once daily. for 5 days 10 tablet 4/26/2020 5/1/2020 Mundo EDWARDS  MD Kelsey        Follow-up Information     Follow up With Specialties Details Why Contact Info    Contreras Ny MD Family Medicine Schedule an appointment as soon as possible for a visit in 1 week  [address] [id number] [phone]                                       Mundo Cole MD  04/26/20 0655

## 2020-05-29 ENCOUNTER — HOSPITAL ENCOUNTER (EMERGENCY)
Facility: HOSPITAL | Age: 60
Discharge: HOME | End: 2020-05-29
Attending: EMERGENCY MEDICINE
Payer: COMMERCIAL

## 2020-05-29 VITALS
DIASTOLIC BLOOD PRESSURE: 84 MMHG | SYSTOLIC BLOOD PRESSURE: 144 MMHG | RESPIRATION RATE: 20 BRPM | BODY MASS INDEX: 40.43 KG/M2 | WEIGHT: 315 LBS | HEART RATE: 99 BPM | TEMPERATURE: 98 F | OXYGEN SATURATION: 99 % | HEIGHT: 74 IN

## 2020-05-29 DIAGNOSIS — S70.01XA CONTUSION OF RIGHT HIP, INITIAL ENCOUNTER: Primary | ICD-10-CM

## 2020-05-29 DIAGNOSIS — R52 PAIN: ICD-10-CM

## 2020-05-29 PROCEDURE — 25000003 PHARM REV CODE 250: Mod: ER | Performed by: NURSE PRACTITIONER

## 2020-05-29 PROCEDURE — 99283 EMERGENCY DEPT VISIT LOW MDM: CPT | Mod: 25,ER

## 2020-05-29 RX ORDER — HYDROCODONE BITARTRATE AND ACETAMINOPHEN 10; 325 MG/1; MG/1
1 TABLET ORAL
Status: COMPLETED | OUTPATIENT
Start: 2020-05-29 | End: 2020-05-29

## 2020-05-29 RX ORDER — ACETAMINOPHEN 325 MG/1
650 TABLET ORAL EVERY 8 HOURS PRN
Qty: 30 TABLET | Refills: 0 | OUTPATIENT
Start: 2020-05-29 | End: 2021-04-24

## 2020-05-29 RX ADMIN — HYDROCODONE BITARTRATE AND ACETAMINOPHEN 1 TABLET: 10; 325 TABLET ORAL at 03:05

## 2020-05-29 NOTE — ED NOTES
Pt to ED c/o right hip pain and mild left knee pain s/p slipping in shower last night and landing on right side. Reports took Norco 10 this morning with continued pain 8/10. Ambulatory with steady gait.

## 2020-05-29 NOTE — ED PROVIDER NOTES
Encounter Date: 5/29/2020    SCRIBE #1 NOTE: ISiena, am scribing for, and in the presence of,  Linwood GOODWIN. I have scribed the following portions of the note - Other sections scribed: HPI, ROS, PE.       History     Chief Complaint   Patient presents with    Hip Pain     Pt reports slipping in shower last night and not c/o right hip pain. Pt ambulatory to triage.      Mundo Galan Sr. is a 60 y.o. male who presents to the ED complaining of right hip pain s/p slipping and falling in the shower last night. Pt denies having any LOC or head injury. Pt reports taking narco x5 hours PTA.    The history is provided by the patient.   Fall   The accident occurred yesterday. Fall occurred: in shower  He fell from a height of 3 to 5 ft. He landed on a hard floor. The point of impact was the right hip. The pain is at a severity of 7/10. He was ambulatory at the scene. There was no entrapment after the fall. There was no drug use involved in the accident. There was no alcohol use involved in the accident. Pertinent negatives include no neck pain, no back pain, no bowel incontinence, no nausea, no headaches, no loss of consciousness and no tingling. The symptoms are aggravated by activity.     Review of patient's allergies indicates:   Allergen Reactions    Benicar [olmesartan] Swelling    Codeine Nausea And Vomiting     Past Medical History:   Diagnosis Date    Anticoagulant long-term use     Anxiety     DVT (deep venous thrombosis)     GERD (gastroesophageal reflux disease)     Hypertension      Past Surgical History:   Procedure Laterality Date    ABDOMINAL AORTA STENT      heart on the rt.     KNEE SURGERY      ROTATOR CUFF REPAIR       Family History   Problem Relation Age of Onset    Hyperlipidemia Mother     Stroke Mother     Heart disease Mother     Arthritis Mother     Hyperlipidemia Father     Stroke Father      Social History     Tobacco Use    Smoking status: Never Smoker    Smokeless  tobacco: Never Used   Substance Use Topics    Alcohol use: No    Drug use: No     Review of Systems   Constitutional: Negative.    HENT: Negative.    Eyes: Negative.    Respiratory: Negative.    Cardiovascular: Negative.    Gastrointestinal: Negative.  Negative for bowel incontinence and nausea.   Endocrine: Negative.    Genitourinary: Negative.    Musculoskeletal: Positive for arthralgias (Rgith hip pain.). Negative for back pain and neck pain.   Skin: Negative.    Allergic/Immunologic: Negative.    Neurological: Negative.  Negative for tingling, loss of consciousness and headaches.   Hematological: Negative.    Psychiatric/Behavioral: Negative.    All other systems reviewed and are negative.      Physical Exam     Initial Vitals [05/29/20 1445]   BP Pulse Resp Temp SpO2   (!) 144/84 99 20 97.8 °F (36.6 °C) 99 %      MAP       --         Physical Exam    Nursing note and vitals reviewed.  Constitutional: He appears well-developed. He is Obese .   HENT:   Head: Normocephalic and atraumatic.   Right Ear: External ear normal.   Left Ear: External ear normal.   Nose: Nose normal.   Mouth/Throat: Oropharynx is clear and moist.   Denies head injury. No LOC.    Eyes: Conjunctivae are normal.   Neck: Normal range of motion. Neck supple.   Cardiovascular: Normal rate, regular rhythm, S1 normal, S2 normal, normal heart sounds and intact distal pulses.   Pulmonary/Chest: Effort normal and breath sounds normal.   Abdominal: Soft. Bowel sounds are normal.   Musculoskeletal: Normal range of motion. He exhibits no edema.        Right hip: He exhibits tenderness. He exhibits normal range of motion, normal strength and no deformity.        Lumbar back: Normal. He exhibits normal range of motion, no tenderness, no bony tenderness and no pain.   Neurological: He is alert and oriented to person, place, and time.   Skin: Skin is warm and dry. Capillary refill takes less than 2 seconds.   Psychiatric: He has a normal mood and affect.  His behavior is normal.         ED Course   Procedures  Labs Reviewed - No data to display       Imaging Results    None       Imaging Results          X-Ray Femur Ap/Lat Right (Final result)  Result time 05/29/20 15:40:02    Final result by Gissell Hernandez MD (05/29/20 15:40:02)                 Impression:      No definite acute process seen      Electronically signed by: Gissell Hernandez MD  Date:    05/29/2020  Time:    15:40             Narrative:    EXAMINATION:  XR FEMUR 2 VIEW RIGHT    CLINICAL HISTORY:  Pain, unspecified    TECHNIQUE:  AP and lateral views of the right femur were performed.    COMPARISON:  None    FINDINGS:  No fracture, no osseous lesions.    Moderate medial knee compartment joint space narrowing and osteophyte formation.    The hip joint appears within normal limits.    Degenerative changes noted at the pubic symphysis.    Vascular calcifications noted.                               X-Ray Hip 2 View Right (Final result)  Result time 05/29/20 15:39:09    Final result by Gissell Hernandez MD (05/29/20 15:39:09)                 Impression:      No definite acute process seen      Electronically signed by: Gissell Hernandez MD  Date:    05/29/2020  Time:    15:39             Narrative:    EXAMINATION:  XR HIP 2 VIEW RIGHT    CLINICAL HISTORY:  Pain, unspecified    TECHNIQUE:  AP view of the pelvis and frog leg lateral view of the right hip were performed.    COMPARISON:  None    FINDINGS:  Bilateral hip joint appear normal on the AP view.  The lateral view of the right hip demonstrate normal femoral head contour.  No fracture, no osseous lesions.  There is degenerative changes at the pubic symphysis.  The bilateral sacroiliac joints appear normal.                                  Medical Decision Making:   Initial Assessment:   60 y.o. male who presents to the ED complaining of right hip pain. Denies having any LOC or head injury.    Differential Diagnosis:   Hip contusion, hip  fracture.  Clinical Tests:   Radiological Study: Ordered and Reviewed  ED Management:  Physical exam.   Medicated with Norco 10/325 mg orally.   Discharged with Tylenol 650 mg every hours prn. Pt states he will take Norco BID.   Follow-up with PCP in 3 days.   Return to ED for worsening of symptoms.               Scribe Attestation:   Scribe #1: I performed the above scribed service and the documentation accurately describes the services I performed. I attest to the accuracy of the note.    This document was produced by a scribe under my direction and in my presence. I agree with the content of the note and have made any necessary edits.     ARY Holly    05/29/2020 6:01 PM                      Clinical Impression:     1. Contusion of right hip, initial encounter    2. Pain                                   ARY Valverde  05/29/20 2616

## 2021-04-24 ENCOUNTER — HOSPITAL ENCOUNTER (EMERGENCY)
Facility: HOSPITAL | Age: 61
Discharge: HOME | End: 2021-04-24
Attending: EMERGENCY MEDICINE
Payer: COMMERCIAL

## 2021-04-24 VITALS
HEIGHT: 74 IN | RESPIRATION RATE: 13 BRPM | HEART RATE: 90 BPM | OXYGEN SATURATION: 96 % | DIASTOLIC BLOOD PRESSURE: 77 MMHG | WEIGHT: 315 LBS | SYSTOLIC BLOOD PRESSURE: 167 MMHG | BODY MASS INDEX: 40.43 KG/M2 | TEMPERATURE: 98 F

## 2021-04-24 DIAGNOSIS — R07.89 CHEST WALL PAIN: Primary | ICD-10-CM

## 2021-04-24 DIAGNOSIS — R79.89 ABNORMAL BLOOD CREATININE LEVEL: ICD-10-CM

## 2021-04-24 LAB
ALBUMIN SERPL-MCNC: 3.3 G/DL (ref 3.3–5.5)
ALP SERPL-CCNC: 112 U/L (ref 42–141)
BILIRUB SERPL-MCNC: 0.5 MG/DL (ref 0.2–1.6)
BUN SERPL-MCNC: 13 MG/DL (ref 7–22)
CALCIUM SERPL-MCNC: 8.8 MG/DL (ref 8–10.3)
CHLORIDE SERPL-SCNC: 107 MMOL/L (ref 98–108)
CREAT SERPL-MCNC: 1.4 MG/DL (ref 0.6–1.2)
GLUCOSE SERPL-MCNC: 106 MG/DL (ref 73–118)
POC ALT (SGPT): 39 U/L (ref 10–47)
POC AST (SGOT): 34 U/L (ref 11–38)
POC CARDIAC TROPONIN I: 0.01 NG/ML
POC PTINR: 1.1 (ref 0.9–1.2)
POC PTWBT: 12.7 SEC (ref 9.7–14.3)
POC TCO2: 30 MMOL/L (ref 18–33)
POTASSIUM BLD-SCNC: 4 MMOL/L (ref 3.6–5.1)
PROTEIN, POC: 7.5 G/DL (ref 6.4–8.1)
SAMPLE: NORMAL
SAMPLE: NORMAL
SODIUM BLD-SCNC: 142 MMOL/L (ref 128–145)

## 2021-04-24 PROCEDURE — 85025 COMPLETE CBC W/AUTO DIFF WBC: CPT | Mod: ER

## 2021-04-24 PROCEDURE — 25000003 PHARM REV CODE 250: Mod: ER | Performed by: NURSE PRACTITIONER

## 2021-04-24 PROCEDURE — 93010 EKG 12-LEAD: ICD-10-PCS | Mod: ,,, | Performed by: INTERNAL MEDICINE

## 2021-04-24 PROCEDURE — 84484 ASSAY OF TROPONIN QUANT: CPT | Mod: ER

## 2021-04-24 PROCEDURE — 99284 EMERGENCY DEPT VISIT MOD MDM: CPT | Mod: 25,ER

## 2021-04-24 PROCEDURE — 85610 PROTHROMBIN TIME: CPT | Mod: ER

## 2021-04-24 PROCEDURE — 93010 ELECTROCARDIOGRAM REPORT: CPT | Mod: ,,, | Performed by: INTERNAL MEDICINE

## 2021-04-24 PROCEDURE — 93005 ELECTROCARDIOGRAM TRACING: CPT | Mod: ER

## 2021-04-24 PROCEDURE — 80053 COMPREHEN METABOLIC PANEL: CPT | Mod: ER

## 2021-04-24 RX ORDER — ASPIRIN 81 MG/1
162 TABLET ORAL
Status: COMPLETED | OUTPATIENT
Start: 2021-04-24 | End: 2021-04-24

## 2021-04-24 RX ORDER — LIDOCAINE 50 MG/G
1 PATCH TOPICAL DAILY
Qty: 15 PATCH | Refills: 0 | Status: SHIPPED | OUTPATIENT
Start: 2021-04-24

## 2021-04-24 RX ORDER — DEXTROMETHORPHAN HYDROBROMIDE, GUAIFENESIN 5; 100 MG/5ML; MG/5ML
650 LIQUID ORAL EVERY 8 HOURS
Qty: 20 TABLET | Refills: 0 | Status: SHIPPED | OUTPATIENT
Start: 2021-04-24

## 2021-04-24 RX ORDER — METHOCARBAMOL 500 MG/1
1000 TABLET, FILM COATED ORAL 3 TIMES DAILY
Qty: 30 TABLET | Refills: 0 | Status: SHIPPED | OUTPATIENT
Start: 2021-04-24 | End: 2021-04-29

## 2021-04-24 RX ADMIN — ASPIRIN 162 MG: 81 TABLET, COATED ORAL at 06:04

## 2021-06-12 ENCOUNTER — HOSPITAL ENCOUNTER (EMERGENCY)
Facility: HOSPITAL | Age: 61
Discharge: HOME | End: 2021-06-12
Attending: INTERNAL MEDICINE
Payer: COMMERCIAL

## 2021-06-12 VITALS
SYSTOLIC BLOOD PRESSURE: 151 MMHG | BODY MASS INDEX: 40.43 KG/M2 | DIASTOLIC BLOOD PRESSURE: 79 MMHG | OXYGEN SATURATION: 99 % | TEMPERATURE: 99 F | HEART RATE: 83 BPM | WEIGHT: 315 LBS | RESPIRATION RATE: 20 BRPM | HEIGHT: 74 IN

## 2021-06-12 DIAGNOSIS — S23.41XA SPRAIN OF COSTAL CARTILAGE, INITIAL ENCOUNTER: Primary | ICD-10-CM

## 2021-06-12 LAB
ALBUMIN SERPL-MCNC: 3.6 G/DL (ref 3.3–5.5)
ALBUMIN SERPL-MCNC: 3.8 G/DL (ref 3.3–5.5)
ALP SERPL-CCNC: 102 U/L (ref 42–141)
ALP SERPL-CCNC: 104 U/L (ref 42–141)
BASOPHILS # BLD AUTO: 0.02 K/UL (ref 0–0.2)
BASOPHILS NFR BLD: 0.3 % (ref 0–1.9)
BILIRUB SERPL-MCNC: 0.5 MG/DL (ref 0.2–1.6)
BILIRUB SERPL-MCNC: 0.6 MG/DL (ref 0.2–1.6)
BILIRUBIN, POC UA: NEGATIVE
BLOOD, POC UA: NEGATIVE
BUN SERPL-MCNC: 9 MG/DL (ref 7–22)
CALCIUM SERPL-MCNC: 9.2 MG/DL (ref 8–10.3)
CHLORIDE SERPL-SCNC: 104 MMOL/L (ref 98–108)
CLARITY, POC UA: CLEAR
COLOR, POC UA: YELLOW
CREAT SERPL-MCNC: 0.7 MG/DL (ref 0.6–1.2)
DIFFERENTIAL METHOD: NORMAL
EOSINOPHIL # BLD AUTO: 0.1 K/UL (ref 0–0.5)
EOSINOPHIL NFR BLD: 1.4 % (ref 0–8)
ERYTHROCYTE [DISTWIDTH] IN BLOOD BY AUTOMATED COUNT: 14.2 % (ref 11.5–14.5)
GLUCOSE SERPL-MCNC: 99 MG/DL (ref 73–118)
GLUCOSE, POC UA: NEGATIVE
HCT VFR BLD AUTO: 44.1 % (ref 40–54)
HGB BLD-MCNC: 14.1 G/DL (ref 14–18)
IMM GRANULOCYTES # BLD AUTO: 0.01 K/UL (ref 0–0.04)
IMM GRANULOCYTES NFR BLD AUTO: 0.2 % (ref 0–0.5)
KETONES, POC UA: NEGATIVE
LEUKOCYTE EST, POC UA: NEGATIVE
LYMPHOCYTES # BLD AUTO: 2.2 K/UL (ref 1–4.8)
LYMPHOCYTES NFR BLD: 34.5 % (ref 18–48)
MCH RBC QN AUTO: 27 PG (ref 27–31)
MCHC RBC AUTO-ENTMCNC: 32 G/DL (ref 32–36)
MCV RBC AUTO: 85 FL (ref 82–98)
MONOCYTES # BLD AUTO: 0.8 K/UL (ref 0.3–1)
MONOCYTES NFR BLD: 12.7 % (ref 4–15)
NEUTROPHILS # BLD AUTO: 3.2 K/UL (ref 1.8–7.7)
NEUTROPHILS NFR BLD: 50.9 % (ref 38–73)
NITRITE, POC UA: NEGATIVE
NRBC BLD-RTO: 0 /100 WBC
PH UR STRIP: 7 [PH]
PLATELET # BLD AUTO: 209 K/UL (ref 150–450)
PMV BLD AUTO: 12.3 FL (ref 9.2–12.9)
POC ALT (SGPT): 30 U/L (ref 10–47)
POC ALT (SGPT): 33 U/L (ref 10–47)
POC AMYLASE: 70 U/L (ref 14–97)
POC AST (SGOT): 35 U/L (ref 11–38)
POC AST (SGOT): 35 U/L (ref 11–38)
POC GGT: 33 U/L (ref 5–65)
POC TCO2: 27 MMOL/L (ref 18–33)
POTASSIUM BLD-SCNC: 3.7 MMOL/L (ref 3.6–5.1)
PROTEIN, POC UA: NEGATIVE
PROTEIN, POC: 6.9 G/DL (ref 6.4–8.1)
PROTEIN, POC: 6.9 G/DL (ref 6.4–8.1)
RBC # BLD AUTO: 5.22 M/UL (ref 4.6–6.2)
SODIUM BLD-SCNC: 140 MMOL/L (ref 128–145)
SPECIFIC GRAVITY, POC UA: 1.01
UROBILINOGEN, POC UA: 0.2 E.U./DL
WBC # BLD AUTO: 6.29 K/UL (ref 3.9–12.7)

## 2021-06-12 PROCEDURE — 85025 COMPLETE CBC W/AUTO DIFF WBC: CPT | Performed by: INTERNAL MEDICINE

## 2021-06-12 PROCEDURE — 81003 URINALYSIS AUTO W/O SCOPE: CPT | Mod: ER

## 2021-06-12 PROCEDURE — 63600175 PHARM REV CODE 636 W HCPCS: Mod: ER | Performed by: INTERNAL MEDICINE

## 2021-06-12 PROCEDURE — 99284 EMERGENCY DEPT VISIT MOD MDM: CPT | Mod: 25,ER

## 2021-06-12 PROCEDURE — 82150 ASSAY OF AMYLASE: CPT | Mod: ER

## 2021-06-12 PROCEDURE — 96374 THER/PROPH/DIAG INJ IV PUSH: CPT | Mod: ER

## 2021-06-12 PROCEDURE — 80053 COMPREHEN METABOLIC PANEL: CPT | Mod: ER

## 2021-06-12 RX ORDER — KETOROLAC TROMETHAMINE 30 MG/ML
30 INJECTION, SOLUTION INTRAMUSCULAR; INTRAVENOUS
Status: COMPLETED | OUTPATIENT
Start: 2021-06-12 | End: 2021-06-12

## 2021-06-12 RX ORDER — IBUPROFEN 800 MG/1
800 TABLET ORAL EVERY 8 HOURS PRN
Qty: 15 TABLET | Refills: 0 | Status: SHIPPED | OUTPATIENT
Start: 2021-06-12

## 2021-06-12 RX ADMIN — KETOROLAC TROMETHAMINE 30 MG: 30 INJECTION, SOLUTION INTRAMUSCULAR; INTRAVENOUS at 10:06

## 2022-01-13 ENCOUNTER — HOSPITAL ENCOUNTER (EMERGENCY)
Facility: HOSPITAL | Age: 62
Discharge: HOME | End: 2022-01-14
Attending: EMERGENCY MEDICINE
Payer: COMMERCIAL

## 2022-01-13 DIAGNOSIS — R07.89 CHEST WALL PAIN: Primary | ICD-10-CM

## 2022-01-13 DIAGNOSIS — R07.9 CHEST PAIN: ICD-10-CM

## 2022-01-13 DIAGNOSIS — R91.1 INCIDENTAL PULMONARY NODULE: ICD-10-CM

## 2022-01-13 LAB
ALBUMIN SERPL-MCNC: 3.5 G/DL (ref 3.3–5.5)
ALP SERPL-CCNC: 93 U/L (ref 42–141)
BILIRUB SERPL-MCNC: 0.6 MG/DL (ref 0.2–1.6)
BUN SERPL-MCNC: 13 MG/DL (ref 7–22)
CALCIUM SERPL-MCNC: 9.2 MG/DL (ref 8–10.3)
CHLORIDE SERPL-SCNC: 104 MMOL/L (ref 98–108)
CREAT SERPL-MCNC: 1.1 MG/DL (ref 0.6–1.2)
GLUCOSE SERPL-MCNC: 118 MG/DL (ref 73–118)
POC ALT (SGPT): 39 U/L (ref 10–47)
POC AST (SGOT): 37 U/L (ref 11–38)
POC B-TYPE NATRIURETIC PEPTIDE: 5.9 PG/ML (ref 0–100)
POC CARDIAC TROPONIN I: 0 NG/ML
POC TCO2: 30 MMOL/L (ref 18–33)
POTASSIUM BLD-SCNC: 4 MMOL/L (ref 3.6–5.1)
PROTEIN, POC: 7.3 G/DL (ref 6.4–8.1)
SAMPLE: NORMAL
SODIUM BLD-SCNC: 142 MMOL/L (ref 128–145)

## 2022-01-13 PROCEDURE — 25000003 PHARM REV CODE 250: Mod: ER | Performed by: EMERGENCY MEDICINE

## 2022-01-13 PROCEDURE — 84484 ASSAY OF TROPONIN QUANT: CPT | Mod: ER

## 2022-01-13 PROCEDURE — 93010 EKG 12-LEAD: ICD-10-PCS | Mod: ,,, | Performed by: INTERNAL MEDICINE

## 2022-01-13 PROCEDURE — 99285 EMERGENCY DEPT VISIT HI MDM: CPT | Mod: 25,ER

## 2022-01-13 PROCEDURE — 83880 ASSAY OF NATRIURETIC PEPTIDE: CPT | Mod: ER

## 2022-01-13 PROCEDURE — 80053 COMPREHEN METABOLIC PANEL: CPT | Mod: ER

## 2022-01-13 PROCEDURE — 85025 COMPLETE CBC W/AUTO DIFF WBC: CPT | Mod: ER

## 2022-01-13 PROCEDURE — 93010 ELECTROCARDIOGRAM REPORT: CPT | Mod: ,,, | Performed by: INTERNAL MEDICINE

## 2022-01-13 PROCEDURE — 63600175 PHARM REV CODE 636 W HCPCS: Mod: ER | Performed by: EMERGENCY MEDICINE

## 2022-01-13 PROCEDURE — 93005 ELECTROCARDIOGRAM TRACING: CPT | Mod: ER

## 2022-01-13 PROCEDURE — 96374 THER/PROPH/DIAG INJ IV PUSH: CPT | Mod: 59,ER

## 2022-01-13 RX ORDER — KETOROLAC TROMETHAMINE 30 MG/ML
15 INJECTION, SOLUTION INTRAMUSCULAR; INTRAVENOUS
Status: COMPLETED | OUTPATIENT
Start: 2022-01-13 | End: 2022-01-13

## 2022-01-13 RX ORDER — ASPIRIN 325 MG
325 TABLET ORAL
Status: COMPLETED | OUTPATIENT
Start: 2022-01-13 | End: 2022-01-13

## 2022-01-13 RX ORDER — METHOCARBAMOL 750 MG/1
1500 TABLET, FILM COATED ORAL
Status: DISCONTINUED | OUTPATIENT
Start: 2022-01-13 | End: 2022-01-13

## 2022-01-13 RX ORDER — METHOCARBAMOL 750 MG/1
750 TABLET, FILM COATED ORAL
Status: COMPLETED | OUTPATIENT
Start: 2022-01-13 | End: 2022-01-13

## 2022-01-13 RX ADMIN — ASPIRIN 325 MG ORAL TABLET 325 MG: 325 PILL ORAL at 10:01

## 2022-01-13 RX ADMIN — KETOROLAC TROMETHAMINE 15 MG: 30 INJECTION, SOLUTION INTRAMUSCULAR at 11:01

## 2022-01-13 RX ADMIN — METHOCARBAMOL 750 MG: 750 TABLET ORAL at 11:01

## 2022-01-13 RX ADMIN — IOHEXOL 100 ML: 350 INJECTION, SOLUTION INTRAVENOUS at 11:01

## 2022-01-14 VITALS
WEIGHT: 315 LBS | SYSTOLIC BLOOD PRESSURE: 128 MMHG | HEIGHT: 74 IN | BODY MASS INDEX: 40.43 KG/M2 | HEART RATE: 79 BPM | RESPIRATION RATE: 18 BRPM | DIASTOLIC BLOOD PRESSURE: 58 MMHG | OXYGEN SATURATION: 96 % | TEMPERATURE: 99 F

## 2022-01-14 PROCEDURE — 25500020 PHARM REV CODE 255: Mod: ER | Performed by: EMERGENCY MEDICINE

## 2022-01-14 RX ORDER — PREDNISONE 20 MG/1
40 TABLET ORAL DAILY
Qty: 10 TABLET | Refills: 0 | Status: SHIPPED | OUTPATIENT
Start: 2022-01-14 | End: 2022-01-19

## 2022-01-14 RX ORDER — METHOCARBAMOL 750 MG/1
1500 TABLET, FILM COATED ORAL EVERY 6 HOURS
Qty: 24 TABLET | Refills: 0 | Status: SHIPPED | OUTPATIENT
Start: 2022-01-14 | End: 2022-01-17

## 2022-01-14 RX ORDER — MELOXICAM 7.5 MG/1
7.5 TABLET ORAL DAILY
Qty: 7 TABLET | Refills: 0 | Status: SHIPPED | OUTPATIENT
Start: 2022-01-14

## 2022-01-14 NOTE — ED PROVIDER NOTES
Encounter Date: 1/13/2022    SCRIBE #1 NOTE: I, Cira Pérez, am scribing for, and in the presence of,  Jose Eduardo Sawyer MD. I have scribed the following portions of the note - Other sections scribed: HPI, ROS, PE.       History     Chief Complaint   Patient presents with    Chest Pain     Pt has been having cp since this morning. Pt states he has tried otc tylenol ibuprofen and then took a norco and robaxin with no improvement. Pt denies any sob     Mundo Galan Sr. is a 61 y.o. male with HTN, GERD, hypercholesterolemia, frequent PVC's, DVT (no longer taking anticoagulation), obesity, others presents to the ED with complaints of acute right sided chest pain and neck pain since 6 am this morning (over 17 hours ago). Pt reports that the chest pain radiates to the right shoulder and thinks it is caused by how he slept.  He states the pain is worse with movements of the right arm especially when she elevating the right arm above his head.  Pt also reports that he took Tylenol, motrin, and Norco which temporarily relieved his symptoms. Denies sleep apnea, cough, shortness of breath, nausea, vomiting, fatigue, or dizziness. Pt reports that he had similar symptoms in his right torso last year. He mentions that he had a myocardial infarction in 2019 and then received a stent. He denies any tobacco or EtOH use.    The history is provided by the patient. No  was used.     Review of patient's allergies indicates:   Allergen Reactions    Benicar [olmesartan] Swelling    Codeine Nausea And Vomiting    Spironolactone      Past Medical History:   Diagnosis Date    Anticoagulant long-term use     Anxiety     DVT (deep venous thrombosis)     GERD (gastroesophageal reflux disease)     Hypertension      Past Surgical History:   Procedure Laterality Date    ABDOMINAL AORTA STENT      heart on the rt.     KNEE SURGERY      ROTATOR CUFF REPAIR       Family History   Problem Relation Age of Onset     Hyperlipidemia Mother     Stroke Mother     Heart disease Mother     Arthritis Mother     Hyperlipidemia Father     Stroke Father      Social History     Tobacco Use    Smoking status: Never Smoker    Smokeless tobacco: Never Used   Substance Use Topics    Alcohol use: No    Drug use: No     Review of Systems   Constitutional: Negative for fatigue.   Respiratory: Negative for apnea, cough and shortness of breath.    Cardiovascular: Positive for chest pain. Negative for palpitations.   Gastrointestinal: Negative for nausea and vomiting.   Musculoskeletal: Positive for neck pain.   Neurological: Negative for dizziness, syncope, weakness and numbness.   All other systems reviewed and are negative.      Physical Exam     Initial Vitals [01/13/22 2106]   BP Pulse Resp Temp SpO2   (!) 145/67 85 18 98.5 °F (36.9 °C) 97 %      MAP       --         Physical Exam    Nursing note and vitals reviewed.  Constitutional: He appears well-developed and well-nourished. He is not diaphoretic. No distress.   HENT:   Head: Normocephalic and atraumatic.   Eyes: Conjunctivae are normal.   Neck: Neck supple.   Normal range of motion.  Cardiovascular: Normal rate.   Pulmonary/Chest: Effort normal. No accessory muscle usage or stridor. No tachypnea. No respiratory distress. He has no decreased breath sounds. He has no wheezes. He has no rhonchi. He has no rales.         He exhibits tenderness. He exhibits no crepitus, no edema, no deformity, no swelling and no retraction.     Pain and tenderness localized in right trapezius. Right anterior chest wall tenderness to palpation.   Abdominal: Bowel sounds are normal. He exhibits no distension.   Musculoskeletal:      Cervical back: Normal range of motion and neck supple. No spinous process tenderness.     Neurological: He is alert and oriented to person, place, and time. He has normal strength.   Skin: Skin is intact. No rash noted.   Psychiatric: He has a normal mood and affect.          ED Course   Procedures  Labs Reviewed   TROPONIN ISTAT   POCT CBC   POCT CMP   POCT TROPONIN   POCT B-TYPE NATRIURETIC PEPTIDE (BNP)   POCT CMP   POCT B-TYPE NATRIURETIC PEPTIDE (BNP)     EKG Readings: (Independently Interpreted)   Initial Reading: No STEMI. Previous EKG: Compared with most recent EKG Previous EKG Date: 2/24/21. Rhythm: Normal Sinus Rhythm. Heart Rate: 103. Ectopy: PVCs Frequent. Conduction: 1st Degree AV Block. ST Segments: Normal ST Segments. T Waves: Normal. Axis: Normal. Clinical Impression: Normal Sinus Rhythm Other Impression: First-degree AV block also present on previous EKG.       Imaging Results          CTA Chest Non-Coronary (PE Study) (Final result)  Result time 01/14/22 00:08:24    Final result by Moisés Rankin MD (01/14/22 00:08:24)                 Impression:      1. The study is nondiagnostic for pulmonary embolism with suboptimal contrast enhancement in the pulmonary arteries.  Recommend clinical correlation pertaining to follow-up.  2. Two tiny bilateral pulmonary nodules.  See above comments.  3. Small hiatal hernia.  4. No acute abnormality.      Electronically signed by: Moisés Rankin  Date:    01/14/2022  Time:    00:08             Narrative:    EXAMINATION:  CTA CHEST NON CORONARY    CLINICAL HISTORY:  Pulmonary embolism (PE) suspected, high prob;    TECHNIQUE:  Low dose axial images, sagittal and coronal reformations were obtained from the thoracic inlet to the lung bases following the IV administration of 100 mL of Omnipaque 350.  Contrast timing was optimized to evaluate the pulmonary arteries.  MIP images were performed.    COMPARISON:  None    FINDINGS:  Pulmonary arteries:Pulmonary arteries are not adequately enhanced for diagnosis of pulmonary embolism..Study is nondiagnostic for PE.    Thoracic soft tissues: Unremarkable.    Aorta: Left-sided aortic arch.  No aneurysm or dissection.    Heart: Normal size. No effusion.    Melany/Mediastinum: No pathologic  nadja enlargement.    Airways: Patent.    Lungs/Pleura: Clear lungs. No pleural effusion or thickening.    Small 4 mm pulmonary nodule left upper lobe on axial 109 of series 2 and 3.  There is a tiny 2 mm pulmonary nodule in the right upper lobe near the pleura anterior laterally on axial 112    For a ground glass nodule <6 mm, Fleischner Society 2017 guidelines recommend no routine follow up. However, suspicious features could warrant follow up with non-contrast chest CT at 2 years and 4 years after discovery.    Esophagus: Unremarkable.    Upper Abdomen: No abnormality of the partially imaged upper abdomen.    Small hiatal hernia.    Bones: No acute fracture. No suspicious lytic or sclerotic lesions.                               X-Ray Chest PA And Lateral (Final result)  Result time 01/13/22 22:40:53    Final result by Cathryn Lundberg MD (01/13/22 22:40:53)                 Impression:      No acute cardiopulmonary process identified.      Electronically signed by: Cathryn Lundberg MD  Date:    01/13/2022  Time:    22:40             Narrative:    EXAMINATION:  XR CHEST PA AND LATERAL    CLINICAL HISTORY:  Chest Pain;    TECHNIQUE:  PA and lateral views of the chest were performed.    COMPARISON:  April 2021.    FINDINGS:  Cardiac silhouette is normal in size.  Lungs are symmetrically expanded.  No evidence of focal consolidative process, pneumothorax, or significant pleural effusion.  No acute osseous abnormality identified.                                 Medications   aspirin tablet 325 mg (325 mg Oral Given 1/13/22 2240)   ketorolac injection 15 mg (15 mg Intravenous Given 1/13/22 2321)   methocarbamoL tablet 750 mg (750 mg Oral Given 1/13/22 2321)   iohexoL (OMNIPAQUE 350) injection 100 mL (100 mLs Intravenous Given 1/13/22 2344)     Medical Decision Making:   History:   Old Medical Records: I decided to obtain old medical records.  Initial Assessment:   61 y.o. male chest wall pain that is worse with  movement. Patient with known CAD with stents but low suspicion for cardiac CP due lacking SOB, exertional component, CP direction correlates with movement and palpation.   Differential Diagnosis:   Costochondritis, cervico-thoracic muscle strain/peripheral neuropathy, pleurisy, pneumonia, PE, others.  Clinical Tests:   Lab Tests: Ordered and Reviewed  Radiological Study: Ordered and Reviewed  Medical Tests: Ordered and Reviewed  ED Management:  CTA suboptimal to r/o PE - doubt PE since O2 sat's at baseline for patient, no SOB or SANDHU.  Alternative diagnoses more likely. Patient advised to return for any acutely worsening symptoms.      Labs Reviewed            Admission on 01/13/2022, Discharged on 01/14/2022   Component Date Value Ref Range Status    POC Cardiac Troponin I 01/13/2022 0.00  <0.09 ng/mL Final    Sample 01/13/2022 unknown   Final    Comment: A single negative troponin is insufficient to rule out myocardial infarction.  The use of a serial sampling protocol is recommended practice. Correlate results with reference intervals established for methodology used. Point of care and core laboratory   troponin results are not interchangeable.      Albumin, POC 01/13/2022 3.5  3.3 - 5.5 g/dL Final    Alkaline Phosphatase, POC 01/13/2022 93  42 - 141 U/L Final    ALT (SGPT), POC 01/13/2022 39  10 - 47 U/L Final    AST (SGOT), POC 01/13/2022 37  11 - 38 U/L Final    POC BUN 01/13/2022 13  7 - 22 mg/dL Final    Calcium, POC 01/13/2022 9.2  8.0 - 10.3 mg/dL Final    POC Chloride 01/13/2022 104  98 - 108 mmol/L Final    POC Creatinine 01/13/2022 1.1  0.6 - 1.2 mg/dL Final    POC Glucose 01/13/2022 118  73 - 118 mg/dL Final    POC Potassium 01/13/2022 4.0  3.6 - 5.1 mmol/L Final    POC Sodium 01/13/2022 142  128 - 145 mmol/L Final    Bilirubin, UA 01/13/2022 0.6  0.2 - 1.6 mg/dL Final    POC TCO2 01/13/2022 30  18 - 33 mmol/L Final    Protein, UA 01/13/2022 7.3  6.4 - 8.1 g/dL Final    POC B-Type  Natriuretic Peptide 01/13/2022 5.9  0.0 - 100.0 pg/mL Final        Imaging Reviewed    Imaging Results          CTA Chest Non-Coronary (PE Study) (Final result)  Result time 01/14/22 00:08:24    Final result by Moisés Rankin MD (01/14/22 00:08:24)                 Impression:      1. The study is nondiagnostic for pulmonary embolism with suboptimal contrast enhancement in the pulmonary arteries.  Recommend clinical correlation pertaining to follow-up.  2. Two tiny bilateral pulmonary nodules.  See above comments.  3. Small hiatal hernia.  4. No acute abnormality.      Electronically signed by: Moisés Rankin  Date:    01/14/2022  Time:    00:08             Narrative:    EXAMINATION:  CTA CHEST NON CORONARY    CLINICAL HISTORY:  Pulmonary embolism (PE) suspected, high prob;    TECHNIQUE:  Low dose axial images, sagittal and coronal reformations were obtained from the thoracic inlet to the lung bases following the IV administration of 100 mL of Omnipaque 350.  Contrast timing was optimized to evaluate the pulmonary arteries.  MIP images were performed.    COMPARISON:  None    FINDINGS:  Pulmonary arteries:Pulmonary arteries are not adequately enhanced for diagnosis of pulmonary embolism..Study is nondiagnostic for PE.    Thoracic soft tissues: Unremarkable.    Aorta: Left-sided aortic arch.  No aneurysm or dissection.    Heart: Normal size. No effusion.    Melany/Mediastinum: No pathologic nadja enlargement.    Airways: Patent.    Lungs/Pleura: Clear lungs. No pleural effusion or thickening.    Small 4 mm pulmonary nodule left upper lobe on axial 109 of series 2 and 3.  There is a tiny 2 mm pulmonary nodule in the right upper lobe near the pleura anterior laterally on axial 112    For a ground glass nodule <6 mm, Fleischner Society 2017 guidelines recommend no routine follow up. However, suspicious features could warrant follow up with non-contrast chest CT at 2 years and 4 years after discovery.    Esophagus:  Unremarkable.    Upper Abdomen: No abnormality of the partially imaged upper abdomen.    Small hiatal hernia.    Bones: No acute fracture. No suspicious lytic or sclerotic lesions.                               X-Ray Chest PA And Lateral (Final result)  Result time 01/13/22 22:40:53    Final result by Cathryn Lundberg MD (01/13/22 22:40:53)                 Impression:      No acute cardiopulmonary process identified.      Electronically signed by: Cathryn Lundberg MD  Date:    01/13/2022  Time:    22:40             Narrative:    EXAMINATION:  XR CHEST PA AND LATERAL    CLINICAL HISTORY:  Chest Pain;    TECHNIQUE:  PA and lateral views of the chest were performed.    COMPARISON:  April 2021.    FINDINGS:  Cardiac silhouette is normal in size.  Lungs are symmetrically expanded.  No evidence of focal consolidative process, pneumothorax, or significant pleural effusion.  No acute osseous abnormality identified.                                Medications given in ED    Medications   aspirin tablet 325 mg (325 mg Oral Given 1/13/22 2240)   ketorolac injection 15 mg (15 mg Intravenous Given 1/13/22 2321)   methocarbamoL tablet 750 mg (750 mg Oral Given 1/13/22 2321)   iohexoL (OMNIPAQUE 350) injection 100 mL (100 mLs Intravenous Given 1/13/22 2344)         Note was created using voice recognition software. Note may have occasional typographical errors that may not have been identified and edited despite good claudio initial review prior to signing.    I, Jose Eduardo Sawyer MD, personally performed the services described in this documentation. All medical record entries made by the scribe were at my direction and in my presence.  I have reviewed the chart and agree that the record reflects my personal performance and is accurate and complete.            Scribe Attestation:   Scribe #1: I performed the above scribed service and the documentation accurately describes the services I performed. I attest to the accuracy of the  note.                 Clinical Impression:   Final diagnoses:  [R07.9] Chest pain  [R07.89] Chest wall pain (Primary)  [R91.1] Incidental pulmonary nodule          ED Disposition Condition    Discharge Stable        ED Prescriptions     Medication Sig Dispense Start Date End Date Auth. Provider    methocarbamoL (ROBAXIN) 750 MG Tab Take 2 tablets (1,500 mg total) by mouth every 6 (six) hours. for 3 days 24 tablet 1/14/2022 1/17/2022 Jose Eduardo Sawyer MD    predniSONE (DELTASONE) 20 MG tablet Take 2 tablets (40 mg total) by mouth once daily. for 5 days 10 tablet 1/14/2022 1/19/2022 Jose Eduardo Sawyer MD    meloxicam (MOBIC) 7.5 MG tablet Take 1 tablet (7.5 mg total) by mouth once daily. 7 tablet 1/14/2022  Jose Eduardo Sawyer MD        Follow-up Information     Follow up With Specialties Details Why Contact Info    Contreras Ny MD Family Medicine Call today to schedule an appointment, and ongoing care [address]  [facility] [address] [phone]      [facility] Emergency Medicine Go to  As needed, If symptoms worsen [address]  [facility] [address] [phone]           Jose Eduardo Sawyer MD  01/14/22 1096

## 2022-01-14 NOTE — ED NOTES
Right side chest and shoulder pain, worse with movement of arm, palpation, and laying flat, reports hx of similar incident in past diagnosed as muscle spasm, reports taking robaxin and motrin at 1900 with no relief

## 2022-05-23 ENCOUNTER — HOSPITAL ENCOUNTER (EMERGENCY)
Facility: HOSPITAL | Age: 62
Discharge: HOME | End: 2022-05-23
Attending: EMERGENCY MEDICINE
Payer: COMMERCIAL

## 2022-05-23 VITALS
RESPIRATION RATE: 20 BRPM | HEIGHT: 74 IN | OXYGEN SATURATION: 97 % | BODY MASS INDEX: 40.43 KG/M2 | DIASTOLIC BLOOD PRESSURE: 98 MMHG | TEMPERATURE: 98 F | HEART RATE: 96 BPM | SYSTOLIC BLOOD PRESSURE: 170 MMHG | WEIGHT: 315 LBS

## 2022-05-23 DIAGNOSIS — U07.1 COVID-19 VIRUS DETECTED: ICD-10-CM

## 2022-05-23 DIAGNOSIS — U07.1 COVID-19: Primary | ICD-10-CM

## 2022-05-23 LAB
CTP QC/QA: YES
INFLUENZA A ANTIGEN, POC: NEGATIVE
INFLUENZA B ANTIGEN, POC: NEGATIVE
POC RAPID STREP A: NEGATIVE
SARS-COV-2 RDRP RESP QL NAA+PROBE: POSITIVE

## 2022-05-23 PROCEDURE — 99282 EMERGENCY DEPT VISIT SF MDM: CPT | Mod: 25,ER

## 2022-05-23 PROCEDURE — U0002 COVID-19 LAB TEST NON-CDC: HCPCS | Mod: ER | Performed by: EMERGENCY MEDICINE

## 2022-05-23 PROCEDURE — 99281 EMR DPT VST MAYX REQ PHY/QHP: CPT | Mod: 25,ER

## 2022-05-23 PROCEDURE — 87502 INFLUENZA DNA AMP PROBE: CPT | Mod: ER

## 2022-05-23 NOTE — ED PROVIDER NOTES
Encounter Date: 5/23/2022    SCRIBE #1 NOTE: I, Megan Case, am scribing for, and in the presence of,  Sofia Geiger MD. I have scribed the following portions of the note - Other sections scribed: HPI; ROS; PE.       History     Chief Complaint   Patient presents with    Nasal Congestion     Pt states he has had nasal congestion since Friday. Pt states it has been getting worse and having productive cough. Pt denies any fever, pt has slight body aches and head pressure     Mundo Galan Sr. is a 62 y.o. male with Hx of HTN who presents to the ED for chief complaint of worsening sore throat and congestion onset 4 days ago. Patient reports symptoms worsened on 05/21. He states his voice is started to get hoarse today. Patient denies chest pain, shortness of breath, abdominal pain, nausea, vomiting, or diarrhea. No further complaints at this time.      The history is provided by the patient. No  was used.     Review of patient's allergies indicates:   Allergen Reactions    Benicar [olmesartan] Swelling    Codeine Nausea And Vomiting    Spironolactone      Past Medical History:   Diagnosis Date    Anticoagulant long-term use     Anxiety     DVT (deep venous thrombosis)     GERD (gastroesophageal reflux disease)     Hypertension      Past Surgical History:   Procedure Laterality Date    ABDOMINAL AORTA STENT      heart on the rt.     KNEE SURGERY      ROTATOR CUFF REPAIR       Family History   Problem Relation Age of Onset    Hyperlipidemia Mother     Stroke Mother     Heart disease Mother     Arthritis Mother     Hyperlipidemia Father     Stroke Father      Social History     Tobacco Use    Smoking status: Never Smoker    Smokeless tobacco: Never Used   Substance Use Topics    Alcohol use: No    Drug use: No     Review of Systems   Constitutional: Negative for chills and fever.   HENT: Positive for congestion, sore throat and voice change. Negative for drooling and  ear pain.    Eyes: Negative for pain, redness and visual disturbance.   Respiratory: Negative for shortness of breath.    Cardiovascular: Negative for chest pain.   Gastrointestinal: Negative for abdominal pain, nausea and vomiting.   Endocrine: Negative for cold intolerance, polydipsia, polyphagia and polyuria.   Genitourinary: Negative for dysuria, flank pain and hematuria.   Musculoskeletal: Negative for back pain.   Skin: Negative for rash.   Allergic/Immunologic: Negative for environmental allergies and food allergies.   Neurological: Negative for dizziness, syncope, weakness, light-headedness and headaches.   Hematological: Does not bruise/bleed easily.   Psychiatric/Behavioral: Negative for agitation, behavioral problems and confusion.   All other systems reviewed and are negative.      Physical Exam     Initial Vitals [05/23/22 0753]   BP Pulse Resp Temp SpO2   (!) 146/97 94 18 98.3 °F (36.8 °C) 96 %      MAP       --         Physical Exam    Nursing note and vitals reviewed.  Constitutional: He appears well-developed and well-nourished. No distress.   HENT:   Head: Normocephalic and atraumatic.   Eyes: EOM are normal. Pupils are equal, round, and reactive to light.   Neck: Neck supple.   Normal range of motion.  Cardiovascular: Normal rate, regular rhythm, normal heart sounds and intact distal pulses. Exam reveals no gallop and no friction rub.    No murmur heard.  Pulmonary/Chest: Breath sounds normal. No stridor. No respiratory distress. He has no wheezes. He has no rhonchi. He has no rales. He exhibits no tenderness.   Abdominal: Abdomen is soft. Bowel sounds are normal. He exhibits no distension and no mass. There is no abdominal tenderness. There is no rebound and no guarding.   Musculoskeletal:         General: Normal range of motion.      Cervical back: Normal range of motion and neck supple.     Neurological: He is alert and oriented to person, place, and time.   Skin: Skin is warm and dry.    Psychiatric: He has a normal mood and affect. His behavior is normal. Judgment and thought content normal.         ED Course   Procedures  Labs Reviewed   SARS-COV-2 RDRP GENE - Abnormal; Notable for the following components:       Result Value    POC Rapid COVID Positive (*)     All other components within normal limits    Narrative:     This test utilizes isothermal nucleic acid amplification   technology to detect the SARS-CoV-2 RdRp nucleic acid segment.   The analytical sensitivity (limit of detection) is 125 genome   equivalents/mL.   A POSITIVE result implies infection with the SARS-CoV-2 virus;   the patient is presumed to be contagious.     A NEGATIVE result means that SARS-CoV-2 nucleic acids are not   present above the limit of detection. A NEGATIVE result should be   treated as presumptive. It does not rule out the possibility of   COVID-19 and should not be the sole basis for treatment decisions.   If COVID-19 is strongly suspected based on clinical and exposure   history, re-testing using an alternate molecular assay should be   considered.   This test is only for use under the Food and Drug   Administration s Emergency Use Authorization (EUA).   Commercial kits are provided by [employer]   Performance characteristics of the EUA have been independently   verified by [facility] Department of   Pathology and Laboratory Medicine.   _________________________________________________________________   The authorized Fact Sheet for Healthcare Providers and the authorized Fact   Sheet for Patients of the ID NOW COVID-19 are available on the FDA   website:     https://www.fda.gov/media/459842/download  https://www.fda.gov/media/333295/download          POCT STREP A MOLECULAR   POCT INFLUENZA A/B MOLECULAR   POCT STREP A, RAPID   POCT RAPID INFLUENZA A/B          Imaging Results    None          Medications - No data to display  Medical Decision Making:   Initial Assessment:   Mundo MCKENZIE  Adama Archibald is a 62 y.o. male with Hx of HTN who presents to the ED for chief complaint of worsening sore throat and congestion onset 4 days ago.  Differential Diagnosis:   Includes not limited to COVID-19 versus influenza versus strep throat versus viral URI.  ED Management:  Patient with positive COVID-19 test.  Patient well-appearing afebrile.  No shortness of breath noted.  Discussed positive results need to self isolate as well as follow-up with primary care doctor for reassessment and for assessment for benefit of monoclonal antibodies.  Patient verbalized understanding and agrees with plan of care.  Will discharge at this time.    9:10 AM 5/23/2022  Riri Geiger MD          DISCLAIMER: This note was prepared with [employer] voice recognition transcription software. Garbled syntax, mangled pronouns, and other bizarre constructions may be attributed to that software system             Scribe Attestation:   Scribe #1: I performed the above scribed service and the documentation accurately describes the services I performed. I attest to the accuracy of the note.               Scribe attestation: I, Riri Geiger MD , personally performed the services described in this documentation.  All medical record entries made by the scribe were at my direction and in my presence.  I have reviewed the chart and agree that the record reflects my personal performance and is accurate and complete.  Clinical Impression:   Final diagnoses:  [U07.1] COVID-19 (Primary)          ED Disposition Condition    Discharge Stable        ED Prescriptions     None        Follow-up Information     Follow up With Specialties Details Why Contact [facility] ED Emergency Medicine  As needed, If symptoms worsen [address] [facility] [address] [phone]    Contreras Ny MD Family Medicine Schedule an appointment as soon as possible for a visit in 1 day  [address] [facility] [facility]  [address]  [place]  [address]  [phone]             Sofia Geiger MD  05/23/22 0910

## 2022-05-24 ENCOUNTER — NURSE TRIAGE (OUTPATIENT)
Dept: ADMINISTRATIVE | Facility: CLINIC | Age: 62
End: 2022-05-24
Payer: COMMERCIAL

## 2022-05-24 NOTE — TELEPHONE ENCOUNTER
"Pt called for hsm.  Pt c/o  Sinus congestion, wet cough, afebrile, bp "ok." Covid protocol followed and pt advised to get in touch with his doctor, for further treatment asap, for he is a high risk Pt. [place] doctor [place] not within [place]. Education provided on covid, isolation, cough, congestion and tx.  Invited Pt to call ooc at [phone] if having any new questions or worsening symptoms. Alert and oriented, Pt agrees.      Reason for Disposition   HIGH RISK for severe COVID complications (e.g., weak immune system, age > 64 years, obesity with BMI > 25, pregnant, chronic lung disease or other chronic medical condition) (Exception: Already seen by PCP and no new or worsening symptoms.)    Additional Information   Negative: SEVERE difficulty breathing (e.g., struggling for each breath, speaks in single words)   Negative: Difficult to awaken or acting confused (e.g., disoriented, slurred speech)   Negative: Bluish (or gray) lips or face now   Negative: Shock suspected (e.g., cold/pale/clammy skin, too weak to stand, low BP, rapid pulse)   Negative: Sounds like a life-threatening emergency to the triager   Negative: SEVERE or constant chest pain or pressure  (Exception: Mild central chest pain, present only when coughing.)   Negative: MODERATE difficulty breathing (e.g., speaks in phrases, SOB even at rest, pulse 100-120)   Negative: Headache and stiff neck (can't touch chin to chest)   Negative: Oxygen level (e.g., pulse oximetry) 90 percent or lower   Negative: Chest pain or pressure   Negative: Patient sounds very sick or weak to the triager   Negative: MILD difficulty breathing (e.g., minimal/no SOB at rest, SOB with walking, pulse <100)   Negative: Fever > 103 F (39.4 C)   Negative: [1] Fever > 101 F (38.3 C) AND [2] over 60 years of age   Negative: [1] Fever > 100.0 F (37.8 C) AND [2] bedridden (e.g., nursing home patient, CVA, chronic illness, recovering from surgery)    Protocols used: CORONAVIRUS " (COVID-19) DIAGNOSED OR [id number]

## 2022-05-30 ENCOUNTER — NURSE TRIAGE (OUTPATIENT)
Dept: ADMINISTRATIVE | Facility: CLINIC | Age: 62
End: 2022-05-30
Payer: COMMERCIAL

## 2022-05-30 NOTE — TELEPHONE ENCOUNTER
Pt called for response to HSM text and he wanted t know about retesting and I told him its not recommended since he can remain + for up to 30 days to voices no other compliant.said that he was feeling fine will call back OOC if any problems  Reason for Disposition   Information only question and nurse able to answer    Protocols used: INFORMATION ONLY CALL - NO TRIAGE-A-[place]

## 2022-09-14 ENCOUNTER — HOSPITAL ENCOUNTER (EMERGENCY)
Facility: HOSPITAL | Age: 62
Discharge: HOME | End: 2022-09-14
Attending: EMERGENCY MEDICINE
Payer: COMMERCIAL

## 2022-09-14 VITALS
OXYGEN SATURATION: 99 % | SYSTOLIC BLOOD PRESSURE: 149 MMHG | TEMPERATURE: 98 F | WEIGHT: 315 LBS | HEIGHT: 74 IN | BODY MASS INDEX: 40.43 KG/M2 | DIASTOLIC BLOOD PRESSURE: 90 MMHG | HEART RATE: 83 BPM | RESPIRATION RATE: 16 BRPM

## 2022-09-14 DIAGNOSIS — M54.50 ACUTE LEFT-SIDED LOW BACK PAIN WITHOUT SCIATICA: Primary | ICD-10-CM

## 2022-09-14 DIAGNOSIS — M54.50 ACUTE EXACERBATION OF CHRONIC LOW BACK PAIN: ICD-10-CM

## 2022-09-14 DIAGNOSIS — G89.29 ACUTE EXACERBATION OF CHRONIC LOW BACK PAIN: ICD-10-CM

## 2022-09-14 PROCEDURE — 96372 THER/PROPH/DIAG INJ SC/IM: CPT | Performed by: NURSE PRACTITIONER

## 2022-09-14 PROCEDURE — 63600175 PHARM REV CODE 636 W HCPCS: Mod: ER | Performed by: NURSE PRACTITIONER

## 2022-09-14 PROCEDURE — 99284 EMERGENCY DEPT VISIT MOD MDM: CPT | Mod: ER

## 2022-09-14 RX ORDER — ORPHENADRINE CITRATE 30 MG/ML
60 INJECTION INTRAMUSCULAR; INTRAVENOUS
Status: COMPLETED | OUTPATIENT
Start: 2022-09-14 | End: 2022-09-14

## 2022-09-14 RX ORDER — ORPHENADRINE CITRATE 100 MG/1
100 TABLET, EXTENDED RELEASE ORAL EVERY 12 HOURS PRN
Qty: 12 TABLET | Refills: 0 | Status: SHIPPED | OUTPATIENT
Start: 2022-09-14

## 2022-09-14 RX ADMIN — ORPHENADRINE CITRATE 60 MG: 30 INJECTION INTRAMUSCULAR; INTRAVENOUS at 11:09

## 2022-09-14 NOTE — ED PROVIDER NOTES
Encounter Date: 9/14/2022    SCRIBE #1 NOTE: I, Megan Case, am scribing for, and in the presence of,  ARY Layton. I have scribed the following portions of the note - Other sections scribed: HPI; ROS.     History     Chief Complaint   Patient presents with    Back Pain     Complains of L sided lower back pain x3 days. Pain worsens with movement. Denies any urinary issues.     Mundo Galan Sr. is a 62 y.o. male with Hx of HTN who presents to the ED for chief complaint of chronic exacerbated left lower back pain. Patient reports 2 days ago he swung 100 lbs of shrimp into a truck and noticed the back pain that same day. He states the pain does not radiate anywhere. Patient attempted treatment with Motrin and Mobic with no relief. He notes he is not able to sleep on his left side due to the pain. Patient denies associated dysuria, frequency, urgency, decreased urination, abdominal pain, or constipation.       The history is provided by the patient. No  was used.   Review of patient's allergies indicates:   Allergen Reactions    Benicar [olmesartan] Swelling    Codeine Nausea And Vomiting    Spironolactone      Past Medical History:   Diagnosis Date    Anticoagulant long-term use     Anxiety     DVT (deep venous thrombosis)     GERD (gastroesophageal reflux disease)     Hypertension      Past Surgical History:   Procedure Laterality Date    ABDOMINAL AORTA STENT      heart on the rt.     KNEE SURGERY      ROTATOR CUFF REPAIR       Family History   Problem Relation Age of Onset    Hyperlipidemia Mother     Stroke Mother     Heart disease Mother     Arthritis Mother     Hyperlipidemia Father     Stroke Father      Social History     Tobacco Use    Smoking status: Never    Smokeless tobacco: Never   Substance Use Topics    Alcohol use: No    Drug use: No     Review of Systems   Constitutional:  Negative for chills and fever.   HENT:  Negative for congestion, ear discharge, ear pain,  rhinorrhea, sore throat and trouble swallowing.    Eyes:  Negative for visual disturbance.   Respiratory:  Negative for cough and shortness of breath.    Cardiovascular:  Negative for chest pain and leg swelling.   Gastrointestinal:  Negative for abdominal pain, constipation, diarrhea, nausea and vomiting.   Genitourinary:  Negative for decreased urine volume, dysuria, frequency and urgency.   Musculoskeletal:  Positive for back pain. Negative for neck pain and neck stiffness.   Skin:  Negative for color change, rash and wound.   Neurological:  Negative for seizures, syncope, speech difficulty, weakness and headaches.   Psychiatric/Behavioral:  Negative for confusion.      Physical Exam     Initial Vitals [09/14/22 1016]   BP Pulse Resp Temp SpO2   (!) 143/77 85 18 98.4 °F (36.9 °C) 97 %      MAP       --         Physical Exam    Nursing note and vitals reviewed.  Constitutional: He appears well-developed and well-nourished. He is not diaphoretic. He is cooperative.  Non-toxic appearance. He does not have a sickly appearance. He does not appear ill. No distress.   Body mass index is 50.07 kg/m².   HENT:   Head: Normocephalic and atraumatic.   Right Ear: External ear normal.   Left Ear: External ear normal.   Nose: Nose normal.   Mouth/Throat: Mucous membranes are normal. No trismus in the jaw.   Neck: Phonation normal.   Normal range of motion.  Cardiovascular:  Normal rate, regular rhythm and intact distal pulses.           Pulses:       Radial pulses are 2+ on the right side and 2+ on the left side.   Musculoskeletal:      Cervical back: Normal range of motion.      Thoracic back: No tenderness or bony tenderness.      Lumbar back: Spasms and tenderness present. No bony tenderness.     Neurological: He is alert and oriented to person, place, and time. No sensory deficit. Coordination normal. GCS eye subscore is 4. GCS verbal subscore is 5. GCS motor subscore is 6.   Skin: Skin is warm, dry and intact. Capillary  refill takes less than 2 seconds. No bruising, no laceration and no rash noted. No cyanosis or erythema.   Psychiatric: He has a normal mood and affect. His speech is normal and behavior is normal. Judgment and thought content normal.       ED Course   Procedures  Labs Reviewed - No data to display       Imaging Results    None          Medications   orphenadrine injection 60 mg (60 mg Intramuscular Given 9/14/22 1158)     Medical Decision Making:   History:   Old Medical Records: I decided to obtain old medical records.     APC / Resident Notes:   This is an evaluation of a 62 y.o. male that presents to the Emergency Department for back pain.  Denies fever, rash, night sweats, weight loss, dysuria, bowel/bladder incontinence, immunosuppression or IV\SQ drug use. The patient is a non-toxic, afebrile, and well appearing male. On physical exam, there is tenderness to the left lower lumbar back with spasm. There is no abdominal pain, CVA tenderness, saddle anesthesia, rash, erythema, or open wounds. Strength and sensation are symmetric bilaterally. Pulses are symmetrical. Describes pain as similar location and nature to his ongoing, intermittent back pain. Vital signs reassuring.     Given the above findings, my overall impression is Left Lumbar Back Pain/Strain. Given the above findings, I do not think the patient has acute vertebral fracture, subluxation, dislocation, sciatica, epidural abscess, cauda equina, shingles, UTI, pyelonephritis.    D/C Information: Norflex. Taking Mobic at home. The diagnosis, treatment plan, instructions for follow-up and reevaluation with PCP as well as ED return precautions were discussed and understanding was verbalized. All questions or concerns have been addressed. FLORINA Mehta, FNP-C   Scribe Attestation:   Scribe #1: I performed the above scribed service and the documentation accurately describes the services I performed. I attest to the accuracy of the note.            Scribe  attestation: I, FLORINA Mehta, EDUARD, personally performed the services described in this documentation.  All medical record entries made by the scribe were at my direction and in my presence.  I have reviewed the chart and agree that the record reflects my personal performance and is accurate and complete.         Clinical Impression:   Final diagnoses:  [M54.50] Acute left-sided low back pain without sciatica (Primary)  [M54.50, G89.29] Acute exacerbation of chronic low back pain      ED Disposition Condition    Discharge Stable          ED Prescriptions       Medication Sig Dispense Start Date End Date Auth. Provider    orphenadrine (NORFLEX) 100 mg tablet Take 1 tablet (100 mg total) by mouth every 12 (twelve) hours as needed for Muscle spasms. 12 tablet 9/14/2022 -- ARY De La Fuente          Follow-up Information       Follow up With Specialties Details Why Contact Info    Your Primary Care Doctor  Schedule an appointment as soon as possible for a visit  Please call and schedule an appointment for follow up this week.     [facility] ED Emergency Medicine Go to  If symptoms worsen [address] [facility] [address] [phone]             ARY De La Fuente  09/14/22 5380

## 2022-09-14 NOTE — DISCHARGE INSTRUCTIONS
§ Please return to the Emergency Department for any new or worsening symptoms including: fever, chest pain, shortness of breath, loss of consciousness, dizziness, weakness, or any other concerns.     § Schedule an appointment for follow up with your Primary Care Doctor as soon as possible for a recheck of your symptoms. If you do not have one, contact the one listed on your discharge paperwork or call the [facility] Appointment Desk at [phone] to schedule an appointment.     § Please take all medication as prescribed. You have been prescribed Norflex (Orphenadrine) for muscle spasms/pain. Please do not take this medication while working, drinking alcohol, swimming, or while driving/operating heavy machinery. This medication may cause drowsiness, dizziness, impair judgment, and reduce physical capabilities.You should not drive, operate heavy machinery, or make life changing decisions while taking this medication.

## 2023-12-25 ENCOUNTER — HOSPITAL ENCOUNTER (EMERGENCY)
Facility: HOSPITAL | Age: 63
Discharge: HOME | End: 2023-12-25
Attending: EMERGENCY MEDICINE
Payer: COMMERCIAL

## 2023-12-25 VITALS
HEART RATE: 103 BPM | BODY MASS INDEX: 48.79 KG/M2 | TEMPERATURE: 98 F | DIASTOLIC BLOOD PRESSURE: 87 MMHG | RESPIRATION RATE: 17 BRPM | WEIGHT: 315 LBS | OXYGEN SATURATION: 99 % | SYSTOLIC BLOOD PRESSURE: 175 MMHG

## 2023-12-25 DIAGNOSIS — B34.9 VIRAL SYNDROME: ICD-10-CM

## 2023-12-25 DIAGNOSIS — Z20.828 EXPOSURE TO THE FLU: Primary | ICD-10-CM

## 2023-12-25 LAB
CTP QC/QA: YES
INFLUENZA A ANTIGEN, POC: NEGATIVE
INFLUENZA B ANTIGEN, POC: NEGATIVE
POC RAPID STREP A: NEGATIVE
SARS-COV-2 RDRP RESP QL NAA+PROBE: NEGATIVE

## 2023-12-25 PROCEDURE — 87635 SARS-COV-2 COVID-19 AMP PRB: CPT | Mod: ER

## 2023-12-25 PROCEDURE — 87804 INFLUENZA ASSAY W/OPTIC: CPT | Mod: 59,ER

## 2023-12-25 PROCEDURE — 99284 EMERGENCY DEPT VISIT MOD MDM: CPT | Mod: ER

## 2023-12-25 RX ORDER — GUAIFENESIN 100 MG/5ML
100-200 SOLUTION ORAL EVERY 4 HOURS PRN
Qty: 60 ML | Refills: 0 | Status: SHIPPED | OUTPATIENT
Start: 2023-12-25 | End: 2024-01-04

## 2023-12-25 RX ORDER — BENZONATATE 100 MG/1
100 CAPSULE ORAL 3 TIMES DAILY PRN
Qty: 20 CAPSULE | Refills: 0 | Status: SHIPPED | OUTPATIENT
Start: 2023-12-25 | End: 2024-01-04

## 2023-12-25 RX ORDER — ACETAMINOPHEN 500 MG
500 TABLET ORAL EVERY 6 HOURS PRN
Qty: 20 TABLET | Refills: 0 | Status: SHIPPED | OUTPATIENT
Start: 2023-12-25

## 2023-12-25 RX ORDER — OSELTAMIVIR PHOSPHATE 75 MG/1
75 CAPSULE ORAL 2 TIMES DAILY
Qty: 10 CAPSULE | Refills: 0 | Status: SHIPPED | OUTPATIENT
Start: 2023-12-25 | End: 2023-12-30

## 2023-12-26 NOTE — DISCHARGE INSTRUCTIONS

## 2023-12-26 NOTE — ED PROVIDER NOTES
Encounter Date: 12/25/2023    SCRIBE #1 NOTE: IRuben, am scribing for, and in the presence of,  Jameson Garza PA-C. I have scribed the following portions of the note - Other sections scribed: HPI, ROS, PE.       History     Chief Complaint   Patient presents with    Influenza     Exposed to flu. Non-productive cough and body aches. Denies fever      Patient is a 63 year old male with past medical history of Hypertension, CAD, presenting to the Emergency room for evaluation of cough, postnasal drip, sore throat and otalgia onset yesterday. Patient reports being covid and flu vaccinated. He admits to positive flu contact. Treatment of Claritin with no relief. Denies fever or chills. Denies rhinorrhea. No further complaints at present time.       The history is provided by the patient. No  was used.     Review of patient's allergies indicates:   Allergen Reactions    Benicar [olmesartan] Swelling    Codeine Nausea And Vomiting    Spironolactone      Past Medical History:   Diagnosis Date    Anticoagulant long-term use     Anxiety     DVT (deep venous thrombosis)     GERD (gastroesophageal reflux disease)     Hypertension      Past Surgical History:   Procedure Laterality Date    ABDOMINAL AORTA STENT      heart on the rt.     KNEE SURGERY      ROTATOR CUFF REPAIR       Family History   Problem Relation Age of Onset    Hyperlipidemia Mother     Stroke Mother     Heart disease Mother     Arthritis Mother     Hyperlipidemia Father     Stroke Father      Social History     Tobacco Use    Smoking status: Never    Smokeless tobacco: Never   Substance Use Topics    Alcohol use: No    Drug use: No     Review of Systems   Constitutional:  Negative for chills and fever.   HENT:  Positive for ear pain, postnasal drip and sore throat. Negative for congestion, rhinorrhea and trouble swallowing.    Respiratory:  Positive for cough. Negative for shortness of breath.    Cardiovascular:  Negative for  chest pain.   Gastrointestinal:  Negative for abdominal pain, nausea and vomiting.   Musculoskeletal:  Negative for neck pain and neck stiffness.   Neurological:  Negative for dizziness, light-headedness, numbness and headaches.       Physical Exam     Initial Vitals [12/25/23 1843]   BP Pulse Resp Temp SpO2   (!) 190/91 103 18 98.8 °F (37.1 °C) 98 %      MAP       --         Physical Exam    Nursing note and vitals reviewed.  Constitutional: He appears well-developed and well-nourished. He is not diaphoretic. No distress.   HENT:   Head: Normocephalic and atraumatic.   Mild posterior oropharyngeal erythema with postnasal drip. No tonsillar swelling, no oropharyngeal exudates, uvula is midline.  No trismus.  No muffled voice.  Patient is tolerating secretions without difficulty.  Patient is speaking in full sentences on exam without difficulty.  Bilateral tympanic membranes are pearly gray without erythema, bulging, perforation.  There is no postauricular swelling, or overlying erythema or tenderness to palpation over mastoids bilaterally.     Neck:   Normal range of motion.  Cardiovascular:  Normal rate, regular rhythm, normal heart sounds and intact distal pulses.     Exam reveals no gallop and no friction rub.       No murmur heard.  Pulmonary/Chest: Effort normal and breath sounds normal. No respiratory distress. He has no decreased breath sounds. He has no wheezes. He has no rhonchi. He has no rales.   Abdominal: Abdomen is soft. Bowel sounds are normal. He exhibits no distension. There is no abdominal tenderness. There is no rebound and no guarding.   Musculoskeletal:         General: Normal range of motion.      Cervical back: Normal range of motion.     Skin: Skin is warm and dry.   Psychiatric: He has a normal mood and affect.         ED Course   Procedures  Labs Reviewed   SARS-COV-2 RDRP GENE    Narrative:     This test utilizes isothermal nucleic acid amplification technology to detect the SARS-CoV-2  RdRp nucleic acid segment. The analytical sensitivity (limit of detection) is 500 copies/swab.     A POSITIVE result is indicative of the presence of SARS-CoV-2 RNA; clinical correlation with patient history and other diagnostic information is necessary to determine patient infection status.    A NEGATIVE result means that SARS-CoV-2 nucleic acids are not present above the limit of detection. A NEGATIVE result should be treated as presumptive. It does not rule out the possibility of COVID-19 and should not be the sole basis for treatment decisions. If COVID-19 is strongly suspected based on clinical and exposure history, re-testing using an alternate molecular assay should be considered.     This test is only for use under the Food and Drug Administration s Emergency Use Authorization (EUA).     Commercial kits are provided by [employer]. Performance characteristics of the EUA have been independently verified by [facility] Department of Pathology and Laboratory Medicine.   _________________________________________________________________   The authorized Fact Sheet for Healthcare Providers and the authorized Fact Sheet for Patients of the ID NOW COVID-19 are available on the FDA website:    https://www.fda.gov/media/131260/download      https://www.fda.gov/media/362975/download       POCT INFLUENZA A/B MOLECULAR   POCT STREP A MOLECULAR   POCT STREP A, RAPID   POCT RAPID INFLUENZA A/B          Imaging Results    None          Medications - No data to display  Medical Decision Making  This is an emergent evaluation of a 63-year-old male with a past medical history of coronary artery disease, hypertension who presents to the emergency department for evaluation of cough, sore throat, otalgia x 1 day.  Physical exam revealing mild posterior oropharyngeal erythema with postnasal drip. No tonsillar swelling, no oropharyngeal exudates, uvula is midline.  No trismus.  No muffled voice.  Patient is  tolerating secretions without difficulty.  Patient is speaking in full sentences on exam without difficulty.  Bilateral tympanic membranes are pearly gray without erythema, bulging, perforation.  There is no postauricular swelling, or overlying erythema or tenderness to palpation over mastoids bilaterally. Regular rate rhythm without murmurs.  No carotid bruits appreciated on exam. Lungs are clear to auscultation bilaterally.  Abdomen is soft, nontender, non distended, with normal bowel sounds.  Differential diagnosis includes but is not limited to COVID, flu, strep, viral URI.  Considered pneumonia but highly doubtful given normal lung exam findings.  Workup initiated viral swabs.  COVID, flu, strep negative.  Patient reports to close contacts with family members in the same household with the flu.  Instructed patient to go into same he has a flu.  Will send home with Tamiflu, Tylenol, Tessalon Perles, Robitussin, benzocaine menthol lozenges. Patient is very well appearing, and in no acute distress. Vital signs are reassuring here in the emergency department, patient is afebrile, breathing comfortable, satting 98 % on room air. Patient/Caregiver is stable for discharge at this time. Patient/Caregiver verbalize understanding of care plan. All questions and concerns were addressed. Discussed strict return precautions with the patient/caregiver. Instructed follow up with primary care provider within 1 week.      Jameson Garza PA-C    DISCLAIMER: This note was prepared with [employer] voice recognition transcription software. Garbled syntax, mangled pronouns, and other bizarre constructions may be attributed to that software system.     Amount and/or Complexity of Data Reviewed  External Data Reviewed: labs.  Labs: ordered. Decision-making details documented in ED Course.    Risk  OTC drugs.  Prescription drug management.       IJameson PA-C, personally performed the services described in this documentation.  All  medical record entries made by the scribe were at my direction and in my presence.  I have reviewed the chart and agree that the record reflects my personal performance and is accurate and complete.       Scribe Attestation:   Scribe #1: I performed the above scribed service and the documentation accurately describes the services I performed. I attest to the accuracy of the note.                               Clinical Impression:  Final diagnoses:  [Z20.828] Exposure to the flu (Primary)  [B34.9] Viral syndrome          ED Disposition Condition    Discharge Stable          ED Prescriptions       Medication Sig Dispense Start Date End Date Auth. Provider    acetaminophen (TYLENOL) 500 MG tablet Take 1 tablet (500 mg total) by mouth every 6 (six) hours as needed. 20 tablet 12/25/2023 -- Jameson Garza PA-C    oseltamivir (TAMIFLU) 75 MG capsule Take 1 capsule (75 mg total) by mouth 2 (two) times daily. for 5 days 10 capsule 12/25/2023 12/30/2023 Jameson Garza PA-C    benzocaine-menthoL 15-3.6 mg Lozg Follow directions on packaging 18 lozenge 12/25/2023 -- Jameson Garza PA-C    benzonatate (TESSALON) 100 MG capsule Take 1 capsule (100 mg total) by mouth 3 (three) times daily as needed. 20 capsule 12/25/2023 1/4/2024 Jameson Garza PA-C    guaiFENesin 100 mg/5 ml (ROBITUSSIN) 100 mg/5 mL syrup Take 5-10 mLs (100-200 mg total) by mouth every 4 (four) hours as needed for Cough. 60 mL 12/25/2023 1/4/2024 Jameson Garza PA-C          Follow-up Information       Follow up With Specialties Details Why Contact Info    Contreras Ny MD Family Medicine   [address]  [place] [address] [phone]      [place] [facility] ED Emergency Medicine Go to  As needed, If symptoms worsen, or new symptoms develop [address] [address] [phone]             Jameson Garza PA-C  12/25/23 1953

## 2024-09-17 ENCOUNTER — HOSPITAL ENCOUNTER (EMERGENCY)
Facility: HOSPITAL | Age: 64
Discharge: HOME | End: 2024-09-17
Attending: STUDENT IN AN ORGANIZED HEALTH CARE EDUCATION/TRAINING PROGRAM
Payer: COMMERCIAL

## 2024-09-17 VITALS
BODY MASS INDEX: 40.43 KG/M2 | WEIGHT: 315 LBS | TEMPERATURE: 98 F | OXYGEN SATURATION: 96 % | HEART RATE: 90 BPM | SYSTOLIC BLOOD PRESSURE: 167 MMHG | DIASTOLIC BLOOD PRESSURE: 95 MMHG | RESPIRATION RATE: 20 BRPM | HEIGHT: 74 IN

## 2024-09-17 DIAGNOSIS — J06.9 VIRAL URI WITH COUGH: ICD-10-CM

## 2024-09-17 DIAGNOSIS — J20.9 ACUTE BRONCHITIS, UNSPECIFIED ORGANISM: Primary | ICD-10-CM

## 2024-09-17 LAB
CTP QC/QA: YES
INFLUENZA A ANTIGEN, POC: NEGATIVE
INFLUENZA B ANTIGEN, POC: NEGATIVE
SARS-COV-2 RDRP RESP QL NAA+PROBE: NEGATIVE

## 2024-09-17 PROCEDURE — 63600175 PHARM REV CODE 636 W HCPCS: Mod: ER | Performed by: STUDENT IN AN ORGANIZED HEALTH CARE EDUCATION/TRAINING PROGRAM

## 2024-09-17 PROCEDURE — 87635 SARS-COV-2 COVID-19 AMP PRB: CPT | Mod: ER | Performed by: STUDENT IN AN ORGANIZED HEALTH CARE EDUCATION/TRAINING PROGRAM

## 2024-09-17 PROCEDURE — 87804 INFLUENZA ASSAY W/OPTIC: CPT | Mod: 59,ER

## 2024-09-17 PROCEDURE — 99284 EMERGENCY DEPT VISIT MOD MDM: CPT | Mod: ER

## 2024-09-17 RX ORDER — ALBUTEROL SULFATE 90 UG/1
1-2 INHALANT RESPIRATORY (INHALATION) EVERY 6 HOURS PRN
Qty: 8 G | Refills: 0 | Status: SHIPPED | OUTPATIENT
Start: 2024-09-17 | End: 2025-09-17

## 2024-09-17 RX ORDER — BENZONATATE 200 MG/1
200 CAPSULE ORAL 3 TIMES DAILY PRN
Qty: 30 CAPSULE | Refills: 0 | Status: SHIPPED | OUTPATIENT
Start: 2024-09-17 | End: 2024-09-27

## 2024-09-17 RX ADMIN — DEXAMETHASONE 10 MG: 2 TABLET ORAL at 08:09

## 2024-09-18 NOTE — ED PROVIDER NOTES
Encounter Date: 9/17/2024       History     Chief Complaint   Patient presents with    Cough     PT REPORTS COUGH, CONGESTION AND SINUS PROBLEMS SINCE FRIDAY     64 y.o. male who has a past medical history of Anticoagulant long-term use, Anxiety, DVT (deep venous thrombosis), GERD (gastroesophageal reflux disease), and Hypertension. presents to the emergency department due to  cough and congestion for the past 3 days.  Patient reports his son had an upper respiratory infection 4 days ago and believes he passed it onto him.  He reports cough is productive of yellowish green fluid.  He reports having cough suppressants from prior prescriptions which he has been taking to no avail.  Furthermore he reports using his son's albuterol inhaler with transient improvement of his symptoms 2 days ago.  In addition patient had prescription for prednisone that he has not taken in the past and has been taking 40 mg daily for the past 2 days.  Denies any associated chest pain or shortness of breath.  Denies any fevers or chills.    The history is provided by the patient.     Review of patient's allergies indicates:   Allergen Reactions    Benicar [olmesartan] Swelling    Codeine Nausea And Vomiting    Spironolactone      Past Medical History:   Diagnosis Date    Anticoagulant long-term use     Anxiety     DVT (deep venous thrombosis)     GERD (gastroesophageal reflux disease)     Hypertension      Past Surgical History:   Procedure Laterality Date    ABDOMINAL AORTA STENT      heart on the rt.     KNEE SURGERY      ROTATOR CUFF REPAIR       Family History   Problem Relation Name Age of Onset    Hyperlipidemia Mother      Stroke Mother      Heart disease Mother      Arthritis Mother      Hyperlipidemia Father      Stroke Father       Social History     Tobacco Use    Smoking status: Never    Smokeless tobacco: Never   Substance Use Topics    Alcohol use: No    Drug use: No     Review of Systems   Constitutional:  Negative for chills  and fever.   HENT:  Positive for congestion. Negative for rhinorrhea.    Eyes:  Negative for pain.   Respiratory:  Positive for cough. Negative for shortness of breath.    Cardiovascular:  Negative for chest pain and leg swelling.   Gastrointestinal:  Negative for abdominal pain, nausea and vomiting.   Genitourinary:  Negative for dysuria and hematuria.   Musculoskeletal:  Negative for joint swelling and neck pain.   Skin:  Negative for rash.   Neurological:  Negative for weakness and headaches.       Physical Exam     Initial Vitals [09/17/24 1922]   BP Pulse Resp Temp SpO2   (!) 167/95 90 20 98.3 °F (36.8 °C) 96 %      MAP       --         Physical Exam    Nursing note and vitals reviewed.  Constitutional: He is not diaphoretic. He is Obese . No distress.   HENT:   Head: Normocephalic and atraumatic.   Eyes: Conjunctivae and EOM are normal. Pupils are equal, round, and reactive to light.   Neck:   Normal range of motion.  Cardiovascular:  Regular rhythm.           Pulses:       Radial pulses are 2+ on the right side and 2+ on the left side.        Posterior tibial pulses are 2+ on the right side and 2+ on the left side.   Pulmonary/Chest: Breath sounds normal. No respiratory distress.   Abdominal: Abdomen is soft. Bowel sounds are normal. He exhibits no distension. There is no abdominal tenderness. There is no rebound and no guarding.   Musculoskeletal:         General: No tenderness. Normal range of motion.      Cervical back: Normal range of motion.     Lymphadenopathy:     He has no cervical adenopathy.   Neurological: He is alert and oriented to person, place, and time.   Skin: Skin is warm. Capillary refill takes less than 2 seconds.   Psychiatric: He has a normal mood and affect. His behavior is normal.         ED Course   Procedures  Labs Reviewed   SARS-COV-2 RDRP GENE       Result Value    POC Rapid COVID Negative       Acceptable Yes     POCT INFLUENZA A/B MOLECULAR   POCT RAPID INFLUENZA  A/B    Influenza B Ag negative      Inflenza A Ag negative            Imaging Results    None          Medications   dexAMETHasone tablet 10 mg (has no administration in time range)     Medical Decision Making:   Initial Assessment:   64 y.o. male who has a past medical history of Anticoagulant long-term use, Anxiety, DVT (deep venous thrombosis), GERD (gastroesophageal reflux disease), and Hypertension. presents to the emergency department due to  cough and congestion for the past 3 days.  Patient in no acute distress, vitals notable for mildly elevated blood pressure otherwise unremarkable.  Exam without any signs of volume overload, lungs clear to auscultation bilaterally.  Posterior pharynx without any significant erythema or purulent discharge.  Suspect patient's symptoms are secondary to acute viral bronchitis.  This is further supported given improvement of symptoms with albuterol inhaler use.  Patient tested for COVID and flu both of which were negative.  Currently no indication for imaging of the low suspicion for pneumonia.  Will treat patient with Tessalon Perles, albuterol, and encouraged patient to continue prednisone that he is taking at home.  Differential Diagnosis:   Differential Diagnosis includes, but is not limited to:  Viral URI, Strep pharyngitis, viral pharyngitis, foreign body aspiration/ingestion, bronchitis, asthma exacerbation, CHF exacerbation, COPD exacerbation, allergy/atopy, influenza, pertussis, PE, pneumonia, lung abscess, fungal infection, TB, epiglottitis.              ED Course as of 09/17/24 2003   Tue Sep 17, 2024   1959 POCT Rapid Influenza A/B [AS]   1959 POCT COVID-19 Rapid Screening [AS]   2001 Pt is currently stable for discharge. I see no indication of an emergent process beyond that addressed during our encounter but have duly counseled the patient/family regarding the need for prompt follow-up as well as the indications that should prompt immediate return to the  emergency room should new or worrisome developments occur. I discussed the ED work up and diagnostic findings with the patient/family. The patient/family has been provided with verbal and printed direction regarding our final diagnosis(es) as well as instructions regarding use of OTC and/or Rx medications intended to manage the patient's aforementioned conditions. The patient/family verbalized an understanding. The patient/family is asked if there are any questions or concerns. We discuss the case, until all issues are addressed to the patient/family's satisfaction. Patient/family understands and is agreeable to the plan.  [AS]      ED Course User Index  [AS] Leeanne Ratliff MD          Medical Decision Making  Amount and/or Complexity of Data Reviewed  Labs: ordered.           Clinical Impression:   Final diagnoses:  [J20.9] Acute bronchitis, unspecified organism (Primary)  [J06.9] Viral URI with cough          ED Disposition Condition    Discharge Stable          ED Prescriptions       Medication Sig Dispense Start Date End Date Auth. Provider    albuterol (PROVENTIL/VENTOLIN HFA) 90 mcg/actuation inhaler Inhale 1-2 puffs into the lungs every 6 (six) hours as needed for Wheezing (Coughing). Rescue 8 g 9/17/2024 9/17/2025 Leeanne Ratliff MD    benzonatate (TESSALON) 200 MG capsule Take 1 capsule (200 mg total) by mouth 3 (three) times daily as needed for Cough. 30 capsule 9/17/2024 9/27/2024 Leeanne Ratliff MD          Follow-up Information    None         DISCLAIMER: This note was prepared with [employer] voice recognition transcription software. Garbled syntax, mangled pronouns, and other bizarre constructions may be attributed to that software system.     Leeanne Ratliff MD  09/17/24 2003

## 2024-09-18 NOTE — DISCHARGE INSTRUCTIONS
Thank you for coming to our Emergency Department today. It is important to remember that some problems are difficult to diagnose and may not be found during your first visit. Be sure to follow up with your primary care doctor and review any labs/imaging that was performed with them. If you do not have a primary care doctor, you may contact the one listed on your discharge paperwork or you may also call the [facility] Appointment Desk at [phone] to schedule an appointment with one.     All medications may potentially have side effects and it is impossible to predict which medications may give you side effects. If you feel that you are having a negative effect of any medication you should immediately stop taking them and seek medical attention.    Return to the ER with any questions/concerns, new/concerning symptoms, worsening or failure to improve. Do not drive or make any important decisions for 24 hours if you have received any pain medications, sedatives or mood altering drugs during your ER visit.

## 2025-03-23 ENCOUNTER — HOSPITAL ENCOUNTER (EMERGENCY)
Facility: HOSPITAL | Age: 65
Discharge: HOME | End: 2025-03-23
Attending: EMERGENCY MEDICINE
Payer: COMMERCIAL

## 2025-03-23 VITALS
TEMPERATURE: 98 F | BODY MASS INDEX: 40.43 KG/M2 | HEART RATE: 95 BPM | SYSTOLIC BLOOD PRESSURE: 142 MMHG | OXYGEN SATURATION: 97 % | RESPIRATION RATE: 18 BRPM | WEIGHT: 315 LBS | DIASTOLIC BLOOD PRESSURE: 88 MMHG | HEIGHT: 74 IN

## 2025-03-23 DIAGNOSIS — M54.50 BILATERAL LOW BACK PAIN WITHOUT SCIATICA, UNSPECIFIED CHRONICITY: ICD-10-CM

## 2025-03-23 DIAGNOSIS — M54.2 NECK PAIN: ICD-10-CM
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
## 2025-03-23 PROCEDURE — 99284 EMERGENCY DEPT VISIT MOD MDM: CPT | Mod: 25,ER

## 2025-03-23 RX ORDER — ORPHENADRINE CITRATE 100 MG/1
100 TABLET, EXTENDED RELEASE ORAL 2 TIMES DAILY PRN
Qty: 20 TABLET | Refills: 0 | Status: SHIPPED | OUTPATIENT
Start: 2025-03-23 | End: 2025-04-02

## 2025-03-23 RX ORDER — LIDOCAINE 50 MG/G
1 PATCH TOPICAL DAILY
Qty: 15 PATCH | Refills: 0 | Status: SHIPPED | OUTPATIENT
Start: 2025-03-23

## 2025-03-23 RX ORDER — ACETAMINOPHEN 500 MG
1000 TABLET ORAL EVERY 6 HOURS PRN
Qty: 20 TABLET | Refills: 0 | Status: SHIPPED | OUTPATIENT
Start: 2025-03-23

## 2025-03-23 NOTE — DISCHARGE INSTRUCTIONS
You have been prescribed NORFLEX for pain. Please do not take this medication while working, drinking alcohol, swimming, or while driving/operating heavy machinery. This medication may cause drowsiness, impair judgment, and reduce physical capabilities.    Thank you for coming to our Emergency Department today. It is important to remember that some problems or medical conditions are difficult to diagnose and may not be found during your Emergency Department visit.     Be sure to follow up with your primary care doctor and review all labs/imaging/tests that were performed during your ER visit with them. Some labs/tests may be outside of the normal range and require non-emergent follow-up and further investigation to help diagnose/exclude/prevent complications or other potentially serious medical conditions that were not addressed during your ER visit.    If you do not have a primary care doctor, you may contact the one listed on your discharge paperwork or you may also call the [facility] Appointment Desk at [phone] to schedule an appointment and establish care with one. It is important to your health that you have a primary care doctor.    Please take all medications as directed. All medications may potentially have side-effects and it is impossible to predict which medications may give you side-effects or what side-effects (if any) they will give you.. If you feel that you are having a negative effect or side-effect of any medication you should immediately stop taking them and seek medical attention. If you feel that you are having a life-threatening reaction call 911.    Return to the ER with any questions/concerns, new/concerning symptoms, worsening or failure to improve.     Do not drive, swim, climb to height, take a bath, operate heavy machinery, drink alcohol or take potentially sedating medications, sign any legal documents or make any important decisions for 24 hours if you have received any pain  medications, sedatives or mood altering drugs during your ER visit or within 24 hours of taking them if they have been prescribed to you.     You can find additional resources for Dentists, hearing aids, durable medical equipment, low cost pharmacies and other resources at [email or web address]    BELOW THIS LINE ONLY APPLIES IF YOU HAVE A COVID TEST PENDING OR IF YOU HAVE BEEN DIAGNOSED WITH COVID:  Please access [facility] to review the results of your test. Until the results of your COVID test return, you should isolate yourself so as not to potentially spread illness to others.   If your COVID test returns positive, you should isolate yourself so as not to spread illness to others. After five full days, if you are feeling better and you have not had fever for 24 hours, you can return to your typical daily activities, but you must wear a mask around others for an additional 5 days.   If your COVID test returns negative and you are either unvaccinated or more than six months out from your two-dose vaccine and are not yet boosted, you should still quarantine for 5 full days followed by strict mask use for an additional 5 full days.   If your COVID test returns negative and you have received your 2-dose initial vaccine as well as a booster, you should continue strict mask use for 10 full days after the exposure.  For all those exposed, best practice includes a test at day 5 after the exposure. This can be a home test or a test through one of the many testing centers throughout our community.   Masking is always advised to limit the spread of COVID. Cdc.gov is an excellent site to obtain the latest up to date recommendations regarding COVID and COVID testing.     CDC Testing and Quarantine Guidelines for patients with exposure to a known-positive COVID-19 person:  A close exposure is defined as anyone who has had an exposure (masked or unmasked) to a known COVID -19 positive person within 6 feet of someone for a  cumulative total of 15 minutes or more over a 24-hour period.   Vaccinated and/or if you recently had a positive covid test within 90 days do NOT need to quarantine after contact with someone who had COVID-19 unless you develop symptoms.   Fully vaccinated people who have not had a positive test within 90 days, should get tested 3-5 days after their exposure, even if they don't have symptoms and wear a mask indoors in public for 14 days following exposure or until their test result is negative.      Unvaccinated and/or NOT had a positive test within 90 days and meet close exposure  You are required by CDC guidelines to quarantine for at least 5 days from time of exposure followed by 5 days of strict masking. It is recommended, but not required to test after 5 days, unless you develop symptoms, in which case you should test at that time.  If you get tested after 5 days and your test is positive, your 5 day period of isolation starts the day of the positive test.    If your exposure does not meet the above definition, you can return to your normal daily activities to include social distancing, wearing a mask and frequent handwashing.      Here is a link to guidance from the CDC:  https://www.cdc.gov/media/releases/2021/s1227-isolation-quarantine-guidance.html      [facility]  Health Testing Sites:  https://ldh.la.gov/page/3934      [employer] website with testing locations and guidance:  [email or web address]
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
## 2025-03-23 NOTE — ED NOTES
"Pt awaiting radiology results, plan of care reviewed with patient. Pt declined Tylenol for pain, stating "that's what I have been taking and it's not really helping". Pt told that since he drove himself, a muscle relaxer is contraindicated, but a prescription will be provided to him with discharge. Pt verbalized understanding and agrees with plan.   "

## 2025-04-02 ENCOUNTER — TELEPHONE (OUTPATIENT)
Dept: ADMINISTRATIVE | Facility: OTHER | Age: 65
End: 2025-04-02
Payer: COMMERCIAL

## 2025-05-29 ENCOUNTER — HOSPITAL ENCOUNTER (EMERGENCY)
Facility: HOSPITAL | Age: 65
Discharge: HOME | End: 2025-05-29
Attending: STUDENT IN AN ORGANIZED HEALTH CARE EDUCATION/TRAINING PROGRAM
Payer: COMMERCIAL

## 2025-05-29 VITALS
BODY MASS INDEX: 40.43 KG/M2 | SYSTOLIC BLOOD PRESSURE: 144 MMHG | RESPIRATION RATE: 16 BRPM | TEMPERATURE: 98 F | DIASTOLIC BLOOD PRESSURE: 86 MMHG | OXYGEN SATURATION: 97 % | WEIGHT: 315 LBS | HEART RATE: 84 BPM | HEIGHT: 74 IN

## 2025-05-29 DIAGNOSIS — L03.032 PARONYCHIA OF GREAT TOE, LEFT: Primary | ICD-10-CM

## 2025-05-29 PROCEDURE — 99284 EMERGENCY DEPT VISIT MOD MDM: CPT | Mod: ER

## 2025-05-29 PROCEDURE — 10060 I&D ABSCESS SIMPLE/SINGLE: CPT | Mod: ER

## 2025-05-29 RX ORDER — MUPIROCIN 20 MG/G
OINTMENT TOPICAL 3 TIMES DAILY
Qty: 22 G | Refills: 0 | Status: SHIPPED | OUTPATIENT
Start: 2025-05-29

## 2025-05-29 RX ORDER — DOXYCYCLINE 100 MG/1
100 CAPSULE ORAL 2 TIMES DAILY
Qty: 10 CAPSULE | Refills: 0 | Status: SHIPPED | OUTPATIENT
Start: 2025-05-29 | End: 2025-06-03

## 2025-05-29 NOTE — ED TRIAGE NOTES
65 y.o male presents to the ED with chief complaint of toe pain. Patient reports he received a pedicure last week and started having left great toe swelling and pain since Monday. Reports applying antibiotic cream without relief. Denies fever. Denies any other symptoms. AAOX4, NAD. Swelling and erythema noted to left great toe.

## 2025-05-29 NOTE — DISCHARGE INSTRUCTIONS

## 2025-05-29 NOTE — ED PROVIDER NOTES
Encounter Date: 5/29/2025    SCRIBE #1 NOTE: I, Steff Lynch, am scribing for, and in the presence of,  Steven Armendariz PA-C. I have scribed the following portions of the note - Other sections scribed: HPI,ROS.       History     Chief Complaint   Patient presents with    Toe Pain     Left big toe pain x 3 days after getting pedicure      Mundo Galan Sr. is a 65 y.o. male, with a PMHx of DVT, pre-diabetic and HTN, who presents to the ED with area of pain and swelling to left great toe onset 2 weeks ago. Patient reports that he went to the nail salon prior to the onset of his symptoms. He reports applying an antibiotic cream to the area with no alleviation of symptoms. Patient reports that he is able to move his toes as normal. Patient reports daily compliance with mounjaro. No other exacerbating or alleviating factors. Denies fever or other associated symptoms.      The history is provided by the patient. No  was used.     Review of patient's allergies indicates:   Allergen Reactions    Benicar [olmesartan] Swelling    Codeine Nausea And Vomiting    Spironolactone      Past Medical History:   Diagnosis Date    Anticoagulant long-term use     Anxiety     DVT (deep venous thrombosis)     GERD (gastroesophageal reflux disease)     Hypertension      Past Surgical History:   Procedure Laterality Date    ABDOMINAL AORTA STENT      heart on the rt.     KNEE SURGERY      ROTATOR CUFF REPAIR       Family History   Problem Relation Name Age of Onset    Hyperlipidemia Mother      Stroke Mother      Heart disease Mother      Arthritis Mother      Hyperlipidemia Father      Stroke Father       Social History[1]  Review of Systems   Constitutional:  Negative for diaphoresis, fatigue, fever and unexpected weight change.   HENT:  Negative for sinus pain and sore throat.    Eyes:  Negative for pain, redness and visual disturbance.   Respiratory:  Negative for cough, chest tightness, shortness of breath  and wheezing.    Cardiovascular:  Negative for chest pain and palpitations.   Gastrointestinal:  Negative for abdominal pain, blood in stool, diarrhea, nausea and vomiting.   Endocrine: Negative for polydipsia, polyphagia and polyuria.   Genitourinary:  Negative for dysuria, frequency and urgency.   Musculoskeletal:  Negative for arthralgias, back pain and myalgias.   Skin:  Positive for wound. Negative for rash.        (+)Area of pain and swelling to left great toe   Allergic/Immunologic: Negative for environmental allergies.   Neurological:  Negative for dizziness, syncope and headaches.   Psychiatric/Behavioral:  Negative for suicidal ideas.        Physical Exam     Initial Vitals [05/29/25 1820]   BP Pulse Resp Temp SpO2   (!) 150/86 90 18 98.2 °F (36.8 °C) 97 %      MAP       --         Physical Exam    Nursing note and vitals reviewed.  Constitutional: Vital signs are normal. He appears well-developed and well-nourished. He is cooperative. He does not appear ill. No distress.   Well-appearing.  No acute distress.   HENT:   Head: Normocephalic and atraumatic.   Right Ear: External ear normal.   Left Ear: External ear normal.   Nose: Nose normal.   Eyes: Conjunctivae and EOM are normal.   Neck: Phonation normal.   Normal range of motion.  Cardiovascular:  Normal rate and regular rhythm.           No murmur heard.  Pulmonary/Chest: Effort normal. No respiratory distress.   Abdominal: Abdomen is flat. He exhibits no distension. There is no abdominal tenderness.   Musculoskeletal:      Cervical back: Normal range of motion.        Feet:      Neurological: He is alert and oriented to person, place, and time. GCS eye subscore is 4. GCS verbal subscore is 5. GCS motor subscore is 6.   Skin: Skin is warm and dry. Capillary refill takes less than 2 seconds. No rash noted.         ED Course   I & D - Incision and Drainage    Date/Time: 5/29/2025 7:48 PM  Location procedure was performed: [facility] EMERGENCY  DEPARTMENT    Performed by: Steven Armendariz PA-C  Authorized by: Shaheen Griffith MD  Pre-operative diagnosis: Paronychia  Post-operative diagnosis: Paronychia  Consent Done: Yes  Consent: Verbal consent obtained  Risks and benefits: risks, benefits and alternatives were discussed  Consent given by: patient  Patient understanding: patient states understanding of the procedure being performed  Patient consent: the patient's understanding of the procedure matches consent given  Patient identity confirmed: , name and verbally with patient  Type: abscess (Paronychia)  Body area: lower extremity  Location details: left big toe    Patient sedated: no  Scalpel size: 18 gauge needle.  Complexity: simple  Drainage: pus  Drainage amount: moderate  Wound treatment: incision, drainage and expression of material  Complications: No  Estimated blood loss (mL): 0  Patient tolerance: Patient tolerated the procedure well with no immediate complications  Comments: Paronychia was drained by lifting the nail fold with an 18 gauge needle and expressing purulent material.  Patient tolerated the procedural with no acute complications.        Labs Reviewed - No data to display       Imaging Results    None          Medications - No data to display  Medical Decision Making  65-year-old male presenting to the emergency department for evaluation of left great toe pain.  Symptoms started after he went to a nail salon.  On exam, he was well-appearing and in no acute distress.  Mildly hypertensive.  Afebrile, no tachycardia. Body mass index is 41.73 kg/m².  There is a paronychia at the lateral nail fold of the left great toe.    Differential diagnosis includes but is not limited to paronychia, felon, tenosynovitis, nail avulsion, sepsis.      Presentation is consistent with paronychia of the left great toe.  Incised and drained as described in procedure note.  Patient tolerated this well with no acute complications.  I will start him on  some doxycycline and provide mupirocin for topical antibiotic ointment.  No evidence of further infectious process or systemic infection.  Stable for discharge home to outpatient follow up.    Return precautions were discussed, all patient questions were answered, and the patient was agreeable to the plan of care.  He was discharged home in stable condition and will follow up with his primary care provider or return to the emergency department if his symptoms worsen or do not improve.     Risk  Prescription drug management.            Scribe Attestation:   Scribe #1: I performed the above scribed service and the documentation accurately describes the services I performed. I attest to the accuracy of the note.              I, Steven Armendariz PA-C, personally performed the services described in this documentation. All medical record entries made by the scribe were at my direction and in my presence. I have reviewed the chart and agree that the record reflects my personal performance and is accurate and complete.                  Clinical Impression:  Final diagnoses:  [L03.032] Paronychia of great toe, left (Primary)          ED Disposition Condition    Discharge Stable          ED Prescriptions       Medication Sig Dispense Start Date End Date Auth. Provider    doxycycline (VIBRAMYCIN) 100 MG Cap Take 1 capsule (100 mg total) by mouth 2 (two) times daily. for 5 days 10 capsule 5/29/2025 6/3/2025 Steven Armendariz PA-C    mupirocin (BACTROBAN) 2 % ointment Apply topically 3 (three) times daily. 22 g 5/29/2025 -- Steven Armendariz PA-C          Follow-up Information       Follow up With Specialties Details Why Contact Info    [facility] -  Schedule an appointment as soon as possible for a visit  As needed, If symptoms worsen [address]  [place] [address]  [phone]                     [1]   Social History  Tobacco Use    Smoking status: Never    Smokeless tobacco: Never   Substance Use Topics     Alcohol use: No    Drug use: No        Steven Armendariz, PA-C  05/29/25 1951

## 2025-08-19 ENCOUNTER — HOSPITAL ENCOUNTER (EMERGENCY)
Facility: HOSPITAL | Age: 65
Discharge: HOME | End: 2025-08-19
Attending: EMERGENCY MEDICINE
Payer: COMMERCIAL

## 2025-08-19 VITALS
HEIGHT: 74 IN | HEART RATE: 87 BPM | OXYGEN SATURATION: 97 % | SYSTOLIC BLOOD PRESSURE: 152 MMHG | WEIGHT: 315 LBS | TEMPERATURE: 98 F | RESPIRATION RATE: 18 BRPM | DIASTOLIC BLOOD PRESSURE: 93 MMHG | BODY MASS INDEX: 40.43 KG/M2

## 2025-08-19 DIAGNOSIS — M25.519 SHOULDER PAIN: ICD-10-CM

## 2025-08-19 DIAGNOSIS — S49.90XA SHOULDER INJURY: ICD-10-CM

## 2025-08-19 LAB — POCT GLUCOSE: 87 MG/DL (ref 70–110)

## 2025-08-19 PROCEDURE — 82962 GLUCOSE BLOOD TEST: CPT | Mod: ER

## 2025-08-19 PROCEDURE — 63600175 PHARM REV CODE 636 W HCPCS: Mod: JZ,TB,ER

## 2025-08-19 PROCEDURE — 96372 THER/PROPH/DIAG INJ SC/IM: CPT

## 2025-08-19 PROCEDURE — 93005 ELECTROCARDIOGRAM TRACING: CPT | Mod: ER

## 2025-08-19 PROCEDURE — 93010 ELECTROCARDIOGRAM REPORT: CPT | Mod: ,,, | Performed by: INTERNAL MEDICINE

## 2025-08-19 PROCEDURE — 99284 EMERGENCY DEPT VISIT MOD MDM: CPT | Mod: 25,ER

## 2025-08-19 RX ORDER — KETOROLAC TROMETHAMINE 30 MG/ML
30 INJECTION, SOLUTION INTRAMUSCULAR; INTRAVENOUS
Status: COMPLETED | OUTPATIENT
Start: 2025-08-19 | End: 2025-08-19

## 2025-08-19 RX ORDER — DEXTROMETHORPHAN HYDROBROMIDE, GUAIFENESIN 5; 100 MG/5ML; MG/5ML
650 LIQUID ORAL EVERY 8 HOURS
Qty: 12 TABLET | Refills: 0 | Status: SHIPPED | OUTPATIENT
Start: 2025-08-19

## 2025-08-19 RX ORDER — ORPHENADRINE CITRATE 100 MG/1
100 TABLET, EXTENDED RELEASE ORAL 2 TIMES DAILY
Qty: 20 TABLET | Refills: 0 | Status: SHIPPED | OUTPATIENT
Start: 2025-08-19 | End: 2025-08-29

## 2025-08-19 RX ORDER — DEXAMETHASONE SODIUM PHOSPHATE 4 MG/ML
8 INJECTION, SOLUTION INTRA-ARTICULAR; INTRALESIONAL; INTRAMUSCULAR; INTRAVENOUS; SOFT TISSUE
Status: COMPLETED | OUTPATIENT
Start: 2025-08-19 | End: 2025-08-19

## 2025-08-19 RX ORDER — ORPHENADRINE CITRATE 30 MG/ML
60 INJECTION INTRAMUSCULAR; INTRAVENOUS
Status: COMPLETED | OUTPATIENT
Start: 2025-08-19 | End: 2025-08-19

## 2025-08-19 RX ADMIN — KETOROLAC TROMETHAMINE 30 MG: 30 INJECTION, SOLUTION INTRAMUSCULAR; INTRAVENOUS at 03:08

## 2025-08-19 RX ADMIN — ORPHENADRINE CITRATE 60 MG: 30 INJECTION, SOLUTION INTRAMUSCULAR; INTRAVENOUS at 03:08

## 2025-08-19 RX ADMIN — DEXAMETHASONE SODIUM PHOSPHATE 8 MG: 4 INJECTION INTRA-ARTICULAR; INTRALESIONAL; INTRAMUSCULAR; INTRAVENOUS; SOFT TISSUE at 04:08

## 2025-08-20 LAB
OHS QRS DURATION: 102 MS
OHS QTC CALCULATION: 469 MS

## 2025-08-24 ENCOUNTER — HOSPITAL ENCOUNTER (EMERGENCY)
Facility: HOSPITAL | Age: 65
Discharge: HOME | End: 2025-08-24
Attending: EMERGENCY MEDICINE
Payer: COMMERCIAL

## 2025-08-24 VITALS
DIASTOLIC BLOOD PRESSURE: 93 MMHG | HEART RATE: 91 BPM | TEMPERATURE: 98 F | SYSTOLIC BLOOD PRESSURE: 153 MMHG | OXYGEN SATURATION: 98 % | HEIGHT: 74 IN | RESPIRATION RATE: 18 BRPM | WEIGHT: 315 LBS | BODY MASS INDEX: 40.43 KG/M2

## 2025-08-24 DIAGNOSIS — B02.9 HERPES ZOSTER WITHOUT COMPLICATION: Primary | ICD-10-CM

## 2025-08-24 PROCEDURE — 99283 EMERGENCY DEPT VISIT LOW MDM: CPT | Mod: ER

## 2025-08-24 PROCEDURE — 25000003 PHARM REV CODE 250: Mod: ER | Performed by: EMERGENCY MEDICINE

## 2025-08-24 RX ORDER — VALACYCLOVIR HYDROCHLORIDE 1 G/1
1000 TABLET, FILM COATED ORAL 3 TIMES DAILY
Qty: 21 TABLET | Refills: 0 | Status: SHIPPED | OUTPATIENT
Start: 2025-08-24 | End: 2025-08-31

## 2025-08-24 RX ORDER — MUPIROCIN 20 MG/G
OINTMENT TOPICAL
Status: COMPLETED | OUTPATIENT
Start: 2025-08-24 | End: 2025-08-24

## 2025-08-24 RX ADMIN — MUPIROCIN: 20 OINTMENT TOPICAL at 08:08

## 2025-08-25 ENCOUNTER — PATIENT OUTREACH (OUTPATIENT)
Facility: OTHER | Age: 65
End: 2025-08-25
Payer: COMMERCIAL